# Patient Record
Sex: FEMALE | Employment: FULL TIME | URBAN - METROPOLITAN AREA
[De-identification: names, ages, dates, MRNs, and addresses within clinical notes are randomized per-mention and may not be internally consistent; named-entity substitution may affect disease eponyms.]

---

## 2017-01-13 ENCOUNTER — TELEPHONE (OUTPATIENT)
Dept: FAMILY MEDICINE | Facility: CLINIC | Age: 42
End: 2017-01-13

## 2017-01-13 NOTE — TELEPHONE ENCOUNTER
Panel Management Review      Patient has the following on her problem list: None      Composite cancer screening  Chart review shows that this patient is due/due soon for the following Pap Smear  Summary:    Patient is due/failing the following:   PAP and PHYSICAL    Action needed:   Patient needs office visit for annual physical.    Type of outreach:    Sent letter.    Questions for provider review:    None                                                                                                                                    Eloisa Giraldo MA       Chart routed to closed .

## 2017-01-13 NOTE — Clinical Note
[facility]  [address] [facility] [id number] [phone]          January 13, 2017    Karol Chanel Nwachi  [address]  [facility] [address]            Karol,      Our records indicate that you have not scheduled for a(n)annual female exam which was recommended by your health care team.     If you are receiving any of these services at another site please bring in a copy at your next visit so we can get it scanned into your chart.     Monitoring and managing your preventative and chronic health conditions are very important to us.     If you have received your health care elsewhere, please call the clinic so the information can be documented in your chart.    Please call [phone] or message us through your [employer] account to schedule an appointment or provide information for your chart.     I look forward to seeing you and working with you on your health care needs.     Sincerely,       Your [facility] Team/mariam              *If you have already scheduled an appointment, please disregard this reminder

## 2017-04-19 ENCOUNTER — TELEPHONE (OUTPATIENT)
Dept: FAMILY MEDICINE | Facility: CLINIC | Age: 42
End: 2017-04-19

## 2017-04-19 NOTE — TELEPHONE ENCOUNTER
Panel Management Review      Patient has the following on her problem list: None      Composite cancer screening  Chart review shows that this patient is due/due soon for the following Pap Smear  Summary:    Patient is due/failing the following:   PAP    Action needed:   Patient needs referral/order: pap    Type of outreach:    Sent letter.    Questions for provider review:    None                                                                                                                                    RAFAL Ames   5:10 PM  4/19/2017         Chart routed to closed .

## 2017-04-19 NOTE — LETTER
[facility]  [address] [facility] [address]  Phone: [phone]    04/19/17    Karol Coffmanuchi Nwachi  [address] [facility] [address]      To whom it may concern:     Our records indicate that you have not scheduled for a(n)annual female exam/pap which was recommended by your health care team. Monitoring and managing your preventative and chronic health conditions are very important to us.     If you have received your health care elsewhere, please provide us with that information so it can be documented in your chart.    Please call [phone] or message us through your [employer] account to schedule an appointment or provide information for your chart.     I look forward to seeing you and working with you on your health care needs.       *If you have already scheduled an appointment, please disregard this reminder    Sincerely,      Sherry Stockton PA-C

## 2018-04-11 ENCOUNTER — RADIANT APPOINTMENT (OUTPATIENT)
Dept: GENERAL RADIOLOGY | Facility: CLINIC | Age: 43
End: 2018-04-11
Attending: FAMILY MEDICINE
Payer: COMMERCIAL

## 2018-04-11 ENCOUNTER — OFFICE VISIT (OUTPATIENT)
Dept: FAMILY MEDICINE | Facility: CLINIC | Age: 43
End: 2018-04-11
Payer: COMMERCIAL

## 2018-04-11 VITALS
HEART RATE: 69 BPM | BODY MASS INDEX: 34.15 KG/M2 | RESPIRATION RATE: 18 BRPM | WEIGHT: 208.4 LBS | TEMPERATURE: 97.5 F | DIASTOLIC BLOOD PRESSURE: 71 MMHG | SYSTOLIC BLOOD PRESSURE: 127 MMHG | OXYGEN SATURATION: 100 %

## 2018-04-11 DIAGNOSIS — M25.561 ACUTE PAIN OF RIGHT KNEE: Primary | ICD-10-CM

## 2018-04-11 DIAGNOSIS — M25.561 ACUTE PAIN OF RIGHT KNEE: ICD-10-CM

## 2018-04-11 PROCEDURE — 99213 OFFICE O/P EST LOW 20 MIN: CPT | Performed by: FAMILY MEDICINE

## 2018-04-11 PROCEDURE — 73560 X-RAY EXAM OF KNEE 1 OR 2: CPT | Mod: RT

## 2018-04-11 ASSESSMENT — PAIN SCALES - GENERAL: PAINLEVEL: SEVERE PAIN (6)

## 2018-04-11 NOTE — LETTER
[facility]  [address] [facility] [id number] [phone]          April 11, 2018    RE:  Karol Pires                                                                                                                                                       [address] [facility] [address]            To whom it may concern:    Karlo Pires is under my professional care for Acute pain of right knee She  may return to work with the following: The employee is ABLE to return to work today.    When the patient returns to work, the following restrictions apply until one week when she needs to be seen:  A) Bend: Occasionally (1-3 hours)  B) Squat: Not at all (0 hours)  C) Walk/Stand: Frequently (4-8 hours)  D) Reach Above Shoulders: Frequently (4-8 hours)  E) Lift, carry, push, and pull no more than:  21-30 lbs.   Avoid pronged walking    Sincerely,        Sin Sexton MD

## 2018-04-11 NOTE — Clinical Note
Please abstract the following data from this visit with this patient into the appropriate field in Epic:  Pap smear done on this date: 4/2015 (approximately), by this group: [facility], results were found in care everywhere .

## 2018-04-11 NOTE — PROGRESS NOTES
SUBJECTIVE:  Karol Pires is a 42 year old female who presents to the clinic today for right knee pain.  Onset of symptoms: 1 week ago.  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 rate 18, weight 208 lb 6.4 oz (94.5 kg), SpO2 100 %.  Patient appears to be in alert and in no apparent distress distress  KNEE EXAM (right)  Effusion: yes  Erythema: no  Patellar tenderness: yes  Medial joint line tenderness: yes  Lateral joint line tenderness: no  Lachman's test: negative  Nir's maneuver: positive  Valgus:positive  Varus:negative  range of motion: full  Calves soft non-tender.  Neurovascularly Intact Distally.      X-rays: normal: no effusions, fractures, spurring, joint space narrowing or loose bodies    ICD-10-CM    1. Acute pain of right knee M25.561 XR Knee Right 1/2 Views    PLAN:Recheck 1 week see letter

## 2018-04-11 NOTE — NURSING NOTE
"Chief Complaint   Patient presents with     Knee Pain     right knee edema, no known injury but did twist knee , left knee previously bothering        Initial /71  Pulse 69  Temp 97.5  F (36.4  C) (Oral)  Resp 18  Wt 208 lb 6.4 oz (94.5 kg)  SpO2 100%  BMI 34.15 kg/m2 Estimated body mass index is 34.15 kg/(m^2) as calculated from the following:    Height as of 6/22/16: 5' 5.5\" (1.664 m).    Weight as of this encounter: 208 lb 6.4 oz (94.5 kg).  Medication Reconciliation: complete  Sanford Soliz CMA    "

## 2018-04-11 NOTE — MR AVS SNAPSHOT
"              After Visit Summary   4/11/2018    Karol Pires    MRN: [id number]           Patient Information     Date Of Birth          1975        Visit Information        Provider Department      4/11/2018 3:45 PM Sin Sexton MD [facility]        Today's Diagnoses     Acute pain of right knee    -  1       Follow-ups after your visit        Your next 10 appointments already scheduled     Apr 12, 2018  9:00 AM CDT   SHORT with Loan Hodges PA-C   [facility]    [address] [facility] [address] [phone]              Who to contact     If you have questions or need follow up information about today's clinic visit or your schedule please contact [facility] directly at [phone].  Normal or non-critical lab and imaging results will be communicated to you by MyChart, letter or phone within 4 business days after the clinic has received the results. If you do not hear from us within 7 days, please contact the clinic through MyChart or phone. If you have a critical or abnormal lab result, we will notify you by phone as soon as possible.  Submit refill requests through [employer] or call your pharmacy and they will forward the refill request to us. Please allow 3 business days for your refill to be completed.          Additional Information About Your Visit        MyChart Information     Resermap lets you send messages to your doctor, view your test results, renew your prescriptions, schedule appointments and more. To sign up, go to [email or web address] . Click on \"Log in\" on the left side of the screen, which will take you to the Welcome page. Then click on \"Sign up Now\" on the right side of the page.     You will be asked to enter the access code listed below, as well as some personal information. Please follow the directions to create your username and password.     Your access code is: " [email or web address]  Expires: 7/10/2018  4:42 PM     Your access code will  in 90 days. If you need help or a new code, please call your Mobile clinic or [phone].        Care EveryWhere ID     This is your Care EveryWhere ID. This could be used by other organizations to access your Mobile medical records  [id number]        Your Vitals Were     Pulse Temperature Respirations Pulse Oximetry BMI (Body Mass Index)       69 97.5  F (36.4  C) (Oral) 18 100% 34.15 kg/m2        Blood Pressure from Last 3 Encounters:   18 127/71   16 111/77   16 117/83    Weight from Last 3 Encounters:   18 208 lb 6.4 oz (94.5 kg)   16 202 lb 3.2 oz (91.7 kg)   16 198 lb 12.8 oz (90.2 kg)               Primary Care Provider Fax #    Physician No Ref-Primary [phone]       No address on file        Equal Access to Services     PATIENCE GILES : Hadii aad ku hadasho Soomaali, waaxda luqadaha, qaybta kaalmada adeegyalynda, anastacia abad . So [facility] [phone].    ATENCIÓN: Si habla español, tiene a castellanos disposición servicios gratuitos de asistencia lingüística. Llame al [phone].    We comply with applicable federal civil rights laws and [place] laws. We do not discriminate on the basis of race, color, national origin, age, disability, sex, sexual orientation, or gender identity.            Thank you!     Thank you for choosing [facility]  for your care. Our goal is always to provide you with excellent care. Hearing back from our patients is one way we can continue to improve our services. Please take a few minutes to complete the written survey that you may receive in the mail after your visit with us. Thank you!             Your Updated Medication List - Protect others around you: Learn how to safely use, store and throw away your medicines at www.disposemymeds.org.          This list is accurate as of 18  4:42 PM.  Always use your most recent med  list.                   Brand Name Dispense Instructions for use Diagnosis    ALEVE PO

## 2018-12-28 ENCOUNTER — OFFICE VISIT (OUTPATIENT)
Dept: URGENT CARE | Facility: URGENT CARE | Age: 43
End: 2018-12-28
Payer: COMMERCIAL

## 2018-12-28 VITALS
OXYGEN SATURATION: 99 % | HEART RATE: 81 BPM | DIASTOLIC BLOOD PRESSURE: 82 MMHG | SYSTOLIC BLOOD PRESSURE: 136 MMHG | WEIGHT: 206 LBS | TEMPERATURE: 98 F | BODY MASS INDEX: 33.76 KG/M2

## 2018-12-28 DIAGNOSIS — M79.662 PAIN OF LEFT CALF: Primary | ICD-10-CM

## 2018-12-28 PROCEDURE — 99214 OFFICE O/P EST MOD 30 MIN: CPT | Performed by: FAMILY MEDICINE

## 2018-12-28 ASSESSMENT — PAIN SCALES - GENERAL: PAINLEVEL: SEVERE PAIN (7)

## 2018-12-28 NOTE — PATIENT INSTRUCTIONS
Left calf pain 3-4 days - no injury  Worse with flexion  CONCERN FOR DVT - RECOMMEND ER EVALUATION FOR RULE OUT.

## 2018-12-28 NOTE — PROGRESS NOTES
Chief complaint: left calf pain    Denies any possibility of pregnancy declined a pregnancy test    Patient has been havign left calf pain   Patient when she hits down can't even flex  Been going on for 3-4 days    No family history of blood clots  No birth control  No recent surgery or trauma or travel    No fevers or chills chest pain or shortness of breath   No back pain     No Known Allergies    Past Medical History:   Diagnosis Date     Diabetes, gestational      Major depression      No active medical problems          Current Outpatient Medications on File Prior to Visit:  Naproxen Sodium (ALEVE PO)      No current facility-administered medications on file prior to visit.     Social History     Tobacco Use     Smoking status: Never Smoker     Smokeless tobacco: Never Used   Substance Use Topics     Alcohol use: No     Drug use: No       ROS:    No thoughts of harming self or others.     OBJECTIVE:  /82   Pulse 81   Temp 98  F (36.7  C) (Oral)   Wt 93.4 kg (206 lb)   SpO2 99%   BMI 33.76 kg/m     General:   awake, alert, and cooperative.  NAD.   Head: Normocephalic, atraumatic.  Eyes: Conjunctiva clear,  Neuro: Alert and oriented - normal speech.  MS: Using extremities freely  Positive katharine's sign left leg  Calf pain and tenderness  PSYCH:  Normal affect, normal speech  SKIN: no obvious rashes    ASSESSMENT:    ICD-10-CM    1. Pain of left calf M79.662        PLAN:   To ER to rule out DVT  Patient declined ambulance. Risks discussed. She will self transport  AVS given   Not sure which ER she plans to go      Advised about symptoms which might herald more serious problems.        Brandi Rose MD

## 2019-02-20 NOTE — PROGRESS NOTES
"   SUBJECTIVE:   CC: Karol Pires is an 43 year old woman who presents for preventive health visit.     Healthy Habits:    Do you get at least three servings of calcium containing foods daily (dairy, green leafy vegetables, etc.)? { :072574::\"yes\"}    Amount of exercise or daily activities, outside of work: { :748943}    Problems taking medications regularly { :918778::\"No\"}    Medication side effects: { :520953::\"No\"}    Have you had an eye exam in the past two years? { :938556}    Do you see a dentist twice per year? { :069026}    Do you have sleep apnea, excessive snoring or daytime drowsiness?{ :029956}  {Outside tests to abstract? :196884}    {additional problems to add (Optional):422034}    Today's PHQ-2 Score:   PHQ-2 ( 1999 [employer]) 4/11/2018 6/22/2016   Q1: Little interest or pleasure in doing things 0 0   Q2: Feeling down, depressed or hopeless 0 0   PHQ-2 Score 0 0     {PHQ-2 LOOK IN ASSESSMENTS (Optional) :943242}  Abuse: Current or Past(Physical, Sexual or Emotional)- {YES/NO/NA:755706}  Do you feel safe in your environment? {YES/NO/NA:209697}    Social History     Tobacco Use     Smoking status: Never Smoker     Smokeless tobacco: Never Used   Substance Use Topics     Alcohol use: No     If you drink alcohol do you typically have >3 drinks per day or >7 drinks per week? {ETOH :472953}                     Reviewed orders with patient.  Reviewed health maintenance and updated orders accordingly - {Yes/No:543930::\"Yes\"}  {Chronicprobdata (Optional):904830}    {Mammo Decision Support (Optional):770079}    Pertinent mammograms are reviewed under the imaging tab.  History of abnormal Pap smear: {PAP HX:933601}     Reviewed and updated as needed this visit by clinical staff         Reviewed and updated as needed this visit by Provider        {HISTORY OPTIONS (Optional):003727}    ROS:  { :716272}    OBJECTIVE:   There were no vitals taken for this visit.  EXAM:  {Exam Choices:041239}    {Diagnostic " "Test Results (Optional):964299::\"Diagnostic Test Results:\",\"none \"}    ASSESSMENT/PLAN:   {Diag Picklist:899085}    COUNSELING:   {FEMALE COUNSELING MESSAGES:896770::\"Reviewed preventive health counseling, as reflected in patient instructions\"}    BP Readings from Last 1 Encounters:   12/28/18 136/82     Estimated body mass index is 33.76 kg/m  as calculated from the following:    Height as of 6/22/16: 1.664 m (5' 5.5\").    Weight as of 12/28/18: 93.4 kg (206 lb).    {BP Counseling- Complete if BP >= 120/80  (Optional):538580}  {Weight Management Plan (ACO) Complete if BMI is abnormal-  Ages 18-64  BMI >24.9.  Age 65+ with BMI <23 or >30 (Optional):321627}     reports that  has never smoked. she has never used smokeless tobacco.  {Tobacco Cessation -- Complete if patient is a smoker (Optional):851099}    Counseling Resources:  ATP IV Guidelines  Pooled Cohorts Equation Calculator  Breast Cancer Risk Calculator  FRAX Risk Assessment  ICSI Preventive Guidelines  Dietary Guidelines for Americans, 2010  USDA's MyPlate  ASA Prophylaxis  Lung CA Screening    Michael Cruz PA-C  [facility]  "

## 2019-02-21 ENCOUNTER — OFFICE VISIT (OUTPATIENT)
Dept: FAMILY MEDICINE | Facility: CLINIC | Age: 44
End: 2019-02-21
Payer: COMMERCIAL

## 2019-02-21 VITALS
RESPIRATION RATE: 18 BRPM | DIASTOLIC BLOOD PRESSURE: 85 MMHG | WEIGHT: 204 LBS | SYSTOLIC BLOOD PRESSURE: 123 MMHG | TEMPERATURE: 98.4 F | BODY MASS INDEX: 33.43 KG/M2 | HEART RATE: 76 BPM | OXYGEN SATURATION: 100 %

## 2019-02-21 DIAGNOSIS — R32 URINARY INCONTINENCE, UNSPECIFIED TYPE: Primary | ICD-10-CM

## 2019-02-21 DIAGNOSIS — R41.3 MEMORY CHANGES: ICD-10-CM

## 2019-02-21 DIAGNOSIS — R80.9 PROTEINURIA, UNSPECIFIED TYPE: ICD-10-CM

## 2019-02-21 LAB
ALBUMIN UR-MCNC: 30 MG/DL
ANION GAP SERPL CALCULATED.3IONS-SCNC: 4 MMOL/L (ref 3–14)
APPEARANCE UR: CLEAR
BASOPHILS # BLD AUTO: 0 10E9/L (ref 0–0.2)
BASOPHILS NFR BLD AUTO: 0.2 %
BILIRUB UR QL STRIP: NEGATIVE
BUN SERPL-MCNC: 9 MG/DL (ref 7–30)
CALCIUM SERPL-MCNC: 9.1 MG/DL (ref 8.5–10.1)
CHLORIDE SERPL-SCNC: 107 MMOL/L (ref 94–109)
CO2 SERPL-SCNC: 29 MMOL/L (ref 20–32)
COLOR UR AUTO: YELLOW
CREAT SERPL-MCNC: 0.44 MG/DL (ref 0.52–1.04)
DIFFERENTIAL METHOD BLD: NORMAL
EOSINOPHIL # BLD AUTO: 0.1 10E9/L (ref 0–0.7)
EOSINOPHIL NFR BLD AUTO: 2.1 %
ERYTHROCYTE [DISTWIDTH] IN BLOOD BY AUTOMATED COUNT: 13.4 % (ref 10–15)
GFR SERPL CREATININE-BSD FRML MDRD: >90 ML/MIN/{1.73_M2}
GLUCOSE SERPL-MCNC: 88 MG/DL (ref 70–99)
GLUCOSE UR STRIP-MCNC: NEGATIVE MG/DL
HCT VFR BLD AUTO: 36.6 % (ref 35–47)
HGB BLD-MCNC: 11.9 G/DL (ref 11.7–15.7)
HGB UR QL STRIP: ABNORMAL
KETONES UR STRIP-MCNC: NEGATIVE MG/DL
LEUKOCYTE ESTERASE UR QL STRIP: NEGATIVE
LYMPHOCYTES # BLD AUTO: 2 10E9/L (ref 0.8–5.3)
LYMPHOCYTES NFR BLD AUTO: 47.2 %
MCH RBC QN AUTO: 27.5 PG (ref 26.5–33)
MCHC RBC AUTO-ENTMCNC: 32.5 G/DL (ref 31.5–36.5)
MCV RBC AUTO: 85 FL (ref 78–100)
MONOCYTES # BLD AUTO: 0.3 10E9/L (ref 0–1.3)
MONOCYTES NFR BLD AUTO: 7.1 %
NEUTROPHILS # BLD AUTO: 1.8 10E9/L (ref 1.6–8.3)
NEUTROPHILS NFR BLD AUTO: 43.4 %
NITRATE UR QL: NEGATIVE
NON-SQ EPI CELLS #/AREA URNS LPF: ABNORMAL /LPF
PH UR STRIP: 6 PH (ref 5–7)
PLATELET # BLD AUTO: 275 10E9/L (ref 150–450)
POTASSIUM SERPL-SCNC: 4.3 MMOL/L (ref 3.4–5.3)
RBC # BLD AUTO: 4.33 10E12/L (ref 3.8–5.2)
RBC #/AREA URNS AUTO: ABNORMAL /HPF
SODIUM SERPL-SCNC: 140 MMOL/L (ref 133–144)
SOURCE: ABNORMAL
SP GR UR STRIP: 1.02 (ref 1–1.03)
UROBILINOGEN UR STRIP-ACNC: 0.2 EU/DL (ref 0.2–1)
WBC # BLD AUTO: 4.2 10E9/L (ref 4–11)
WBC #/AREA URNS AUTO: ABNORMAL /HPF

## 2019-02-21 PROCEDURE — 99214 OFFICE O/P EST MOD 30 MIN: CPT | Performed by: PHYSICIAN ASSISTANT

## 2019-02-21 PROCEDURE — 81001 URINALYSIS AUTO W/SCOPE: CPT | Performed by: PHYSICIAN ASSISTANT

## 2019-02-21 PROCEDURE — 36415 COLL VENOUS BLD VENIPUNCTURE: CPT | Performed by: PHYSICIAN ASSISTANT

## 2019-02-21 PROCEDURE — 80048 BASIC METABOLIC PNL TOTAL CA: CPT | Performed by: PHYSICIAN ASSISTANT

## 2019-02-21 PROCEDURE — 85025 COMPLETE CBC W/AUTO DIFF WBC: CPT | Performed by: PHYSICIAN ASSISTANT

## 2019-02-21 NOTE — PROGRESS NOTES
"SUBJECTIVE:                                                    Karol Pires is a 43 year old female who presents to clinic today for the following health issues:  New patient to me with multiple concerns.    Pt was referred to see nephrology in the past - was told she had either glucose or blood in urine - has trouble with urinary incontinence at times for 2 month. Drinks coffee. No pain. No abdominal pains.     Memory issues at times  - worried something is going on with her brain - would like a MRI  Is forgetful.     Notices after she eats she gets a dizzy feeling for \"long time\"   - wondering if maybe she has diabetes.   Not attributed to any food.     Problem list and histories reviewed & adjusted, as indicated.  Additional history: as documented      Patient Active Problem List   Diagnosis     PPD positive     CARDIOVASCULAR SCREENING; LDL GOAL LESS THAN 160     Vitamin D deficiency     Past Surgical History:   Procedure Laterality Date     NO HISTORY OF SURGERY         Social History     Tobacco Use     Smoking status: Never Smoker     Smokeless tobacco: Never Used   Substance Use Topics     Alcohol use: No     History reviewed. No pertinent family history.      No current outpatient medications on file.     No Known Allergies  BP Readings from Last 3 Encounters:   02/21/19 123/85   12/28/18 136/82   04/11/18 127/71    Wt Readings from Last 3 Encounters:   02/21/19 92.5 kg (204 lb)   12/28/18 93.4 kg (206 lb)   04/11/18 94.5 kg (208 lb 6.4 oz)            Labs reviewed in EPIC    ROS:  Constitutional, HEENT, cardiovascular, pulmonary, gi and gu systems are negative, except as otherwise noted.    OBJECTIVE:     /85   Pulse 76   Temp 98.4  F (36.9  C) (Oral)   Resp 18   Wt 92.5 kg (204 lb)   SpO2 100%   BMI 33.43 kg/m    Body mass index is 33.43 kg/m .  GENERAL: healthy, alert and no distress- A&Ox3. Unable to count back from 100 by 7's. Recalled 2 of 3 objects.   EYES: Eyes grossly normal " to inspection, PERRL and conjunctivae and sclerae normal  HENT: ear canals and TM's normal, nose and mouth without ulcers or lesions  NECK: no adenopathy, no asymmetry, masses, or scars and thyroid normal to palpation  RESP: lungs clear to auscultation - no rales, rhonchi or wheezes  BREAST: normal without masses, tenderness or nipple discharge and no palpable axillary masses or adenopathy  CV: regular rate and rhythm, normal S1 S2, no S3 or S4, no murmur, click or rub, no peripheral edema and peripheral pulses strong  ABDOMEN: soft, nontender, no hepatosplenomegaly, no masses and bowel sounds normal  MS: no gross musculoskeletal defects noted, no edema  SKIN: no suspicious lesions or rashes  NEURO: Normal strength and tone, sensory exam grossly normal, mentation intact and cranial nerves 2-12 intact  PSYCH: mentation appears normal, affect normal/bright    Diagnostic Test Results:  Results for orders placed or performed in visit on 02/21/19 (from the past 24 hour(s))   UA reflex to Microscopic and Culture   Result Value Ref Range    Color Urine Yellow     Appearance Urine Clear     Glucose Urine Negative NEG^Negative mg/dL    Bilirubin Urine Negative NEG^Negative    Ketones Urine Negative NEG^Negative mg/dL    Specific Gravity Urine 1.025 1.003 - 1.035    Blood Urine Trace (A) NEG^Negative    pH Urine 6.0 5.0 - 7.0 pH    Protein Albumin Urine 30 (A) NEG^Negative mg/dL    Urobilinogen Urine 0.2 0.2 - 1.0 EU/dL    Nitrite Urine Negative NEG^Negative    Leukocyte Esterase Urine Negative NEG^Negative    Source Midstream Urine    Urine Microscopic   Result Value Ref Range    WBC Urine 0 - 5 OTO5^0 - 5 /HPF    RBC Urine 2-5 (A) OTO2^O - 2 /HPF    Squamous Epithelial /LPF Urine Few FEW^Few /LPF   CBC with platelets differential   Result Value Ref Range    WBC 4.2 4.0 - 11.0 10e9/L    RBC Count 4.33 3.8 - 5.2 10e12/L    Hemoglobin 11.9 11.7 - 15.7 g/dL    Hematocrit 36.6 35.0 - 47.0 %    MCV 85 78 - 100 fl    MCH 27.5 26.5  - 33.0 pg    MCHC 32.5 31.5 - 36.5 g/dL    RDW 13.4 10.0 - 15.0 %    Platelet Count 275 150 - 450 10e9/L    % Neutrophils 43.4 %    % Lymphocytes 47.2 %    % Monocytes 7.1 %    % Eosinophils 2.1 %    % Basophils 0.2 %    Absolute Neutrophil 1.8 1.6 - 8.3 10e9/L    Absolute Lymphocytes 2.0 0.8 - 5.3 10e9/L    Absolute Monocytes 0.3 0.0 - 1.3 10e9/L    Absolute Eosinophils 0.1 0.0 - 0.7 10e9/L    Absolute Basophils 0.0 0.0 - 0.2 10e9/L    Diff Method Automated Method        ASSESSMENT/PLAN:         ICD-10-CM    1. Urinary incontinence, unspecified type R32 UA reflex to Microscopic and Culture     Urine Microscopic     UROLOGY ADULT REFERRAL     CANCELED: Lipid panel reflex to direct LDL Fasting   2. Proteinuria, unspecified type R80.9 Basic metabolic panel     NEPHROLOGY ADULT REFERRAL     CBC with platelets differential   3. Memory changes R41.3 NEUROLOGY ADULT REFERRAL     CBC with platelets differential   1. Follow up  With Urology  2. Follow up  With Nephrology  3. Follow up  with Neurology    Labs pending  Follow up  As needed. Needs to establish PCP.       Michael Cruz PA-C  [facility]

## 2019-02-21 NOTE — PROGRESS NOTES
"SUBJECTIVE:                                                    Karol Pires is a 43 year old female who presents to clinic today for the following health issues:    Dizziness      Duration: ***    Description   Feeling faint:  { :831654}  Feeling like the surroundings are moving: { :388378}  Loss of consciousness or falls: { :961553}    Intensity:  {mild,moderate,severe:367207}    Accompanying signs and symptoms:   Nausea/vomitting: { :497101}  Palpitations: { :642085}  Weakness in arms or legs: { :323719}  Vision or speech changes: { :119459}  Ringing in ears (Tinnitus): { :458271}  Hearing loss related to dizziness: { :151432}  Other (fevers/chills/sweating/dyspnea): { :806885}    History (similar episodes/head trauma/previous evaluation/recent bleeding): {NONE DEFAULTED:637733::\"None\"}    Precipitating or alleviating factors (new meds/chemicals): {NONE DEFAULTED:149107::\"None\"}  Worse with activity/head movement: { :112898}    Therapies tried and outcome: {NONE DEFAULTED:368956::\"None\"}      Problem list and histories reviewed & adjusted, as indicated.  Additional history: {NONE - AS DOCUMENTED:681921::\"as documented\"}    {additional problems for provider to add:247735}    {HIST REVIEW/ LINKS 2:919643}    {PROVIDER CHARTING PREFERENCE:888812}    "

## 2019-03-26 DIAGNOSIS — R80.9 PROTEINURIA: Primary | ICD-10-CM

## 2019-03-27 ENCOUNTER — OFFICE VISIT (OUTPATIENT)
Dept: NEPHROLOGY | Facility: CLINIC | Age: 44
End: 2019-03-27
Attending: PHYSICIAN ASSISTANT
Payer: COMMERCIAL

## 2019-03-27 VITALS
HEART RATE: 90 BPM | SYSTOLIC BLOOD PRESSURE: 123 MMHG | HEIGHT: 65 IN | OXYGEN SATURATION: 100 % | DIASTOLIC BLOOD PRESSURE: 91 MMHG | BODY MASS INDEX: 34.39 KG/M2 | WEIGHT: 206.4 LBS

## 2019-03-27 DIAGNOSIS — R80.9 PROTEINURIA, UNSPECIFIED TYPE: Primary | ICD-10-CM

## 2019-03-27 DIAGNOSIS — R80.8 OTHER PROTEINURIA: ICD-10-CM

## 2019-03-27 PROCEDURE — 86038 ANTINUCLEAR ANTIBODIES: CPT | Performed by: INTERNAL MEDICINE

## 2019-03-27 PROCEDURE — 86160 COMPLEMENT ANTIGEN: CPT | Performed by: INTERNAL MEDICINE

## 2019-03-27 PROCEDURE — 99204 OFFICE O/P NEW MOD 45 MIN: CPT | Performed by: INTERNAL MEDICINE

## 2019-03-27 PROCEDURE — 36415 COLL VENOUS BLD VENIPUNCTURE: CPT | Performed by: INTERNAL MEDICINE

## 2019-03-27 ASSESSMENT — MIFFLIN-ST. JEOR: SCORE: 1582.1

## 2019-03-27 ASSESSMENT — PAIN SCALES - GENERAL: PAINLEVEL: NO PAIN (0)

## 2019-03-27 NOTE — LETTER
3/27/2019       RE: Karol Pires  [address]  [place] [address]     Dear Colleague,    Thank you for referring your patient, Karol Pires, to the [facility]. Please see a copy of my visit note below.    March 27, 2019    I was asked to see this patient by Michael Cruz PA-C     CC: Proteinuria.     HPI: Karol Pires is a 43 year old female who presents for evaluation of proteinuria   Has had protein on UA going back to 2013. I do not see quantification. She has had occasional blood and RBC's in her urine. She has had 5 pregnancies and has a h/o gestational DM with glycosuria in 2015 during her last pregnancy. No miscarriages. Does not have any other medical conditions, not on any medications.     - History of Hematuria: occasional microscopic   - Swelling: none   - Hx of UTIs: used to be quite frequent, not any more   - Hx of stones: no  - Rashes/Joint pain: no  - Family hx of kidney disease: no, HTN in mother   - NSAID use: occasional.     # Proteinuria: protein + on dipstick urine. Has had occasional blood and a few RBC's. Does not have any systemic symptoms concerning for autoimmune diseases. Creatinine is wnl.   --Will check urine albumin/cr ratio as well as protein/cr rato  --Check KIP, C3 C4 for completion of w/u although have low suspicion for lupus.   --Consider 24 hour urine collection if she has true proteinuria as orthostatic and exercise induced proteinuria cannot be ruled out.     Ammy Jones MD        No Known Allergies      No current outpatient medications on file prior to visit.  No current facility-administered medications on file prior to visit.     Past Medical History:   Diagnosis Date     Diabetes, gestational      Major depression      No active medical problems        Past Surgical History:   Procedure Laterality Date     NO HISTORY OF SURGERY         Social History  "    Tobacco Use     Smoking status: Never Smoker     Smokeless tobacco: Never Used   Substance Use Topics     Alcohol use: No     Drug use: No       No family history on file.    ROS: A 12 system review of systems was negative other than noted here or above.     Exam:  BP (!) 123/91 (BP Location: Left arm, Patient Position: Chair, Cuff Size: Adult Regular)   Pulse 90   Ht 1.651 m (5' 5\")   Wt 93.6 kg (206 lb 6.4 oz)   SpO2 100%   BMI 34.35 kg/m       GENERAL APPEARANCE: alert and no distress  EYES: PERRL, no scleral icterus  HENT: mouth without ulcers or lesions  NECK: supple, no adenopathy  RESP: lungs clear to auscultation   CV: regular rhythm, normal rate, no rub  Extremities: no clubbing, cyanosis, or edema  SKIN: no rash  NEURO: mentation intact and speech normal  PSYCH: affect normal/bright    Results:    No visits with results within 1 Day(s) from this visit.   Latest known visit with results is:   Office Visit on 02/21/2019   Component Date Value Ref Range Status     Color Urine 02/21/2019 Yellow   Final     Appearance Urine 02/21/2019 Clear   Final     Glucose Urine 02/21/2019 Negative  NEG^Negative mg/dL Final     Bilirubin Urine 02/21/2019 Negative  NEG^Negative Final     Ketones Urine 02/21/2019 Negative  NEG^Negative mg/dL Final     Specific Gravity Urine 02/21/2019 1.025  1.003 - 1.035 Final     Blood Urine 02/21/2019 Trace* NEG^Negative Final     pH Urine 02/21/2019 6.0  5.0 - 7.0 pH Final     Protein Albumin Urine 02/21/2019 30* NEG^Negative mg/dL Final     Urobilinogen Urine 02/21/2019 0.2  0.2 - 1.0 EU/dL Final     Nitrite Urine 02/21/2019 Negative  NEG^Negative Final     Leukocyte Esterase Urine 02/21/2019 Negative  NEG^Negative Final     Source 02/21/2019 Midstream Urine   Final     Sodium 02/21/2019 140  133 - 144 mmol/L Final     Potassium 02/21/2019 4.3  3.4 - 5.3 mmol/L Final     Chloride 02/21/2019 107  94 - 109 mmol/L Final     Carbon Dioxide 02/21/2019 29  20 - 32 mmol/L Final     " Anion Gap 02/21/2019 4  3 - 14 mmol/L Final     Glucose 02/21/2019 88  70 - 99 mg/dL Final    Non Fasting     Urea Nitrogen 02/21/2019 9  7 - 30 mg/dL Final     Creatinine 02/21/2019 0.44* 0.52 - 1.04 mg/dL Final     GFR Estimate 02/21/2019 >90  >60 mL/min/[1.73_m2] Final    Comment: Non  GFR Calc  Starting 12/18/2018, serum creatinine based estimated GFR (eGFR) will be   calculated using the Chronic Kidney Disease Epidemiology Collaboration   (CKD-EPI) equation.       GFR Estimate If Black 02/21/2019 >90  >60 mL/min/[1.73_m2] Final    Comment:  GFR Calc  Starting 12/18/2018, serum creatinine based estimated GFR (eGFR) will be   calculated using the Chronic Kidney Disease Epidemiology Collaboration   (CKD-EPI) equation.       Calcium 02/21/2019 9.1  8.5 - 10.1 mg/dL Final     WBC Urine 02/21/2019 0 - 5  OTO5^0 - 5 /HPF Final     RBC Urine 02/21/2019 2-5* OTO2^O - 2 /HPF Final     Squamous Epithelial /LPF Urine 02/21/2019 Few  FEW^Few /LPF Final     WBC 02/21/2019 4.2  4.0 - 11.0 10e9/L Final     RBC Count 02/21/2019 4.33  3.8 - 5.2 10e12/L Final     Hemoglobin 02/21/2019 11.9  11.7 - 15.7 g/dL Final     Hematocrit 02/21/2019 36.6  35.0 - 47.0 % Final     MCV 02/21/2019 85  78 - 100 fl Final     MCH 02/21/2019 27.5  26.5 - 33.0 pg Final     MCHC 02/21/2019 32.5  31.5 - 36.5 g/dL Final     RDW 02/21/2019 13.4  10.0 - 15.0 % Final     Platelet Count 02/21/2019 275  150 - 450 10e9/L Final     % Neutrophils 02/21/2019 43.4  % Final     % Lymphocytes 02/21/2019 47.2  % Final     % Monocytes 02/21/2019 7.1  % Final     % Eosinophils 02/21/2019 2.1  % Final     % Basophils 02/21/2019 0.2  % Final     Absolute Neutrophil 02/21/2019 1.8  1.6 - 8.3 10e9/L Final     Absolute Lymphocytes 02/21/2019 2.0  0.8 - 5.3 10e9/L Final     Absolute Monocytes 02/21/2019 0.3  0.0 - 1.3 10e9/L Final     Absolute Eosinophils 02/21/2019 0.1  0.0 - 0.7 10e9/L Final     Absolute Basophils 02/21/2019 0.0  0.0 - 0.2  10e9/L Final     Diff Method 02/21/2019 Automated Method   Final           Again, thank you for allowing me to participate in the care of your patient.      Sincerely,    Ammy Jones MD

## 2019-03-27 NOTE — PROGRESS NOTES
March 27, 2019    I was asked to see this patient by Michael Cruz PA-C     CC: Proteinuria.     HPI: Karol Pires is a 43 year old female who presents for evaluation of proteinuria   Has had protein on UA going back to 2013. I do not see quantification. She has had occasional blood and RBC's in her urine. She has had 5 pregnancies and has a h/o gestational DM with glycosuria in 2015 during her last pregnancy. No miscarriages. Does not have any other medical conditions, not on any medications.     - History of Hematuria: occasional microscopic   - Swelling: none   - Hx of UTIs: used to be quite frequent, not any more   - Hx of stones: no  - Rashes/Joint pain: no  - Family hx of kidney disease: no, HTN in mother   - NSAID use: occasional.     # Proteinuria: protein + on dipstick urine. Has had occasional blood and a few RBC's. Does not have any systemic symptoms concerning for autoimmune diseases. Creatinine is wnl.   --Will check urine albumin/cr ratio as well as protein/cr rato  --Check KIP, C3 C4 for completion of w/u although have low suspicion for lupus.   --Consider 24 hour urine collection if she has true proteinuria as orthostatic and exercise induced proteinuria cannot be ruled out.     Ammy Jones MD        No Known Allergies      No current outpatient medications on file prior to visit.  No current facility-administered medications on file prior to visit.     Past Medical History:   Diagnosis Date     Diabetes, gestational      Major depression      No active medical problems        Past Surgical History:   Procedure Laterality Date     NO HISTORY OF SURGERY         Social History     Tobacco Use     Smoking status: Never Smoker     Smokeless tobacco: Never Used   Substance Use Topics     Alcohol use: No     Drug use: No       No family history on file.    ROS: A 12 system review of systems was negative other than noted here or above.     Exam:  BP (!) 123/91 (BP Location: Left arm,  "Patient Position: Chair, Cuff Size: Adult Regular)   Pulse 90   Ht 1.651 m (5' 5\")   Wt 93.6 kg (206 lb 6.4 oz)   SpO2 100%   BMI 34.35 kg/m      GENERAL APPEARANCE: alert and no distress  EYES: PERRL, no scleral icterus  HENT: mouth without ulcers or lesions  NECK: supple, no adenopathy  RESP: lungs clear to auscultation   CV: regular rhythm, normal rate, no rub  Extremities: no clubbing, cyanosis, or edema  SKIN: no rash  NEURO: mentation intact and speech normal  PSYCH: affect normal/bright    Results:    No visits with results within 1 Day(s) from this visit.   Latest known visit with results is:   Office Visit on 02/21/2019   Component Date Value Ref Range Status     Color Urine 02/21/2019 Yellow   Final     Appearance Urine 02/21/2019 Clear   Final     Glucose Urine 02/21/2019 Negative  NEG^Negative mg/dL Final     Bilirubin Urine 02/21/2019 Negative  NEG^Negative Final     Ketones Urine 02/21/2019 Negative  NEG^Negative mg/dL Final     Specific Gravity Urine 02/21/2019 1.025  1.003 - 1.035 Final     Blood Urine 02/21/2019 Trace* NEG^Negative Final     pH Urine 02/21/2019 6.0  5.0 - 7.0 pH Final     Protein Albumin Urine 02/21/2019 30* NEG^Negative mg/dL Final     Urobilinogen Urine 02/21/2019 0.2  0.2 - 1.0 EU/dL Final     Nitrite Urine 02/21/2019 Negative  NEG^Negative Final     Leukocyte Esterase Urine 02/21/2019 Negative  NEG^Negative Final     Source 02/21/2019 Midstream Urine   Final     Sodium 02/21/2019 140  133 - 144 mmol/L Final     Potassium 02/21/2019 4.3  3.4 - 5.3 mmol/L Final     Chloride 02/21/2019 107  94 - 109 mmol/L Final     Carbon Dioxide 02/21/2019 29  20 - 32 mmol/L Final     Anion Gap 02/21/2019 4  3 - 14 mmol/L Final     Glucose 02/21/2019 88  70 - 99 mg/dL Final    Non Fasting     Urea Nitrogen 02/21/2019 9  7 - 30 mg/dL Final     Creatinine 02/21/2019 0.44* 0.52 - 1.04 mg/dL Final     GFR Estimate 02/21/2019 >90  >60 mL/min/[1.73_m2] Final    Comment: Non  GFR " Calc  Starting 12/18/2018, serum creatinine based estimated GFR (eGFR) will be   calculated using the Chronic Kidney Disease Epidemiology Collaboration   (CKD-EPI) equation.       GFR Estimate If Black 02/21/2019 >90  >60 mL/min/[1.73_m2] Final    Comment:  GFR Calc  Starting 12/18/2018, serum creatinine based estimated GFR (eGFR) will be   calculated using the Chronic Kidney Disease Epidemiology Collaboration   (CKD-EPI) equation.       Calcium 02/21/2019 9.1  8.5 - 10.1 mg/dL Final     WBC Urine 02/21/2019 0 - 5  OTO5^0 - 5 /HPF Final     RBC Urine 02/21/2019 2-5* OTO2^O - 2 /HPF Final     Squamous Epithelial /LPF Urine 02/21/2019 Few  FEW^Few /LPF Final     WBC 02/21/2019 4.2  4.0 - 11.0 10e9/L Final     RBC Count 02/21/2019 4.33  3.8 - 5.2 10e12/L Final     Hemoglobin 02/21/2019 11.9  11.7 - 15.7 g/dL Final     Hematocrit 02/21/2019 36.6  35.0 - 47.0 % Final     MCV 02/21/2019 85  78 - 100 fl Final     MCH 02/21/2019 27.5  26.5 - 33.0 pg Final     MCHC 02/21/2019 32.5  31.5 - 36.5 g/dL Final     RDW 02/21/2019 13.4  10.0 - 15.0 % Final     Platelet Count 02/21/2019 275  150 - 450 10e9/L Final     % Neutrophils 02/21/2019 43.4  % Final     % Lymphocytes 02/21/2019 47.2  % Final     % Monocytes 02/21/2019 7.1  % Final     % Eosinophils 02/21/2019 2.1  % Final     % Basophils 02/21/2019 0.2  % Final     Absolute Neutrophil 02/21/2019 1.8  1.6 - 8.3 10e9/L Final     Absolute Lymphocytes 02/21/2019 2.0  0.8 - 5.3 10e9/L Final     Absolute Monocytes 02/21/2019 0.3  0.0 - 1.3 10e9/L Final     Absolute Eosinophils 02/21/2019 0.1  0.0 - 0.7 10e9/L Final     Absolute Basophils 02/21/2019 0.0  0.0 - 0.2 10e9/L Final     Diff Method 02/21/2019 Automated Method   Final

## 2019-03-27 NOTE — NURSING NOTE
"Karol Pires's goals for this visit include:   Chief Complaint   Patient presents with     Consult     proteinuria       She requests these members of her care team be copied on today's visit information: NO    PCP: Valorie Higgins    Referring Provider:  Michael Cruz PA-C  [id number] [place] [address]    BP (!) 123/91 (BP Location: Left arm, Patient Position: Chair, Cuff Size: Adult Regular)   Pulse 90   Ht 1.651 m (5' 5\")   Wt 93.6 kg (206 lb 6.4 oz)   SpO2 100%   BMI 34.35 kg/m      Do you need any medication refills at today's visit? NO    Marlyn Acosta CMA      "

## 2019-03-28 LAB
ANA SER QL IF: NEGATIVE
C4 SERPL-MCNC: 34 MG/DL (ref 15–50)

## 2019-04-19 ENCOUNTER — PRE VISIT (OUTPATIENT)
Dept: NEUROLOGY | Facility: CLINIC | Age: 44
End: 2019-04-19

## 2019-04-19 NOTE — TELEPHONE ENCOUNTER
[facility] CLINICAL DOCUMENTATION    Pre-Visit Planning   PREVISIT INFORMATION                                                    Karol Buchanan Taylordarrell scheduled for future visit at [facility] specialty clinics.    Patient is scheduled to see Christen (provider) on  (date)  Reason for visit: R41.3 (ICD-10-CM) - Memory changes  Referring provider Oppel  Has patient seen previous specialist? ??  Medical Records:  EPIC    REVIEW                                                      New patient packet mailed to patient: Yes 4/3  Medication reconciliation complete: No      No current outpatient medications on file.       Allergies: Patient has no known allergies.    (insert provider dot-phrase for provider specific visit requirements)    PLAN/FOLLOW-UP NEEDED                                                      Previsit review complete.  Patient will see provider at future scheduled appointment.   Unable to contact pt. Unable to leave message.    Patient Reminders Given:  Please, make sure you bring an updated list of your medications.   If you are having a procedure, please, present 15 minutes early.  If you need to cancel or reschedule,please call [phone].    Darla Severin-Brown

## 2019-05-01 ENCOUNTER — TELEPHONE (OUTPATIENT)
Dept: NEPHROLOGY | Facility: CLINIC | Age: 44
End: 2019-05-01

## 2019-05-01 NOTE — LETTER
May 1, 2019      Karol Buchanan [facility]  [address]   [place] [address]              Dear Karol,    It appears that you have some lab orders ordered by Dr. Jones that have yet to be done. Please schedule an appointment at an [facility] or [facility] lab to have these done.     Let us know if you have any questions or concerns.       Sincerely,      Nephrology Clinic- Dr. Jones's office  [facility]

## 2019-05-01 NOTE — TELEPHONE ENCOUNTER
Received overdue lab result for patient. Upon chart review, lab work ordered by Dr. Jones has not been completed. Attempted to reach patient to discuss. Unable to reach patient. Left message.  Will send letter.     Loan Grider RN, BSN  Nephrology Care Coordinator  [facility]

## 2019-07-17 NOTE — PROGRESS NOTES
"   SUBJECTIVE:   CC: Karol Pires is an 44 year old woman who presents for preventive health visit.     Healthy Habits:    Do you get at least three servings of calcium containing foods daily (dairy, green leafy vegetables, etc.)? { :231384::\"yes\"}    Amount of exercise or daily activities, outside of work: { :245569}    Problems taking medications regularly { :997396::\"No\"}    Medication side effects: { :536978::\"No\"}    Have you had an eye exam in the past two years? { :144751}    Do you see a dentist twice per year? { :163492}    Do you have sleep apnea, excessive snoring or daytime drowsiness?{ :723338}  {Outside tests to abstract? :173738}    {additional problems to add (Optional):838951}    Today's PHQ-2 Score:   PHQ-2 ( 1999 Pfizer) 4/11/2018 6/22/2016   Q1: Little interest or pleasure in doing things 0 0   Q2: Feeling down, depressed or hopeless 0 0   PHQ-2 Score 0 0     {PHQ-2 LOOK IN ASSESSMENTS (Optional) :234825}  Abuse: Current or Past(Physical, Sexual or Emotional)- {YES/NO/NA:966558}  Do you feel safe in your environment? {YES/NO/NA:136776}    Social History     Tobacco Use     Smoking status: Never Smoker     Smokeless tobacco: Never Used   Substance Use Topics     Alcohol use: No     If you drink alcohol do you typically have >3 drinks per day or >7 drinks per week? {ETOH :907620}                     Reviewed orders with patient.  Reviewed health maintenance and updated orders accordingly - {Yes/No:399733::\"Yes\"}  {Chronicprobdata (Optional):732988}    {Mammo Decision Support (Optional):702693}    Pertinent mammograms are reviewed under the imaging tab.  History of abnormal Pap smear: {PAP HX:549617}     Reviewed and updated as needed this visit by clinical staff         Reviewed and updated as needed this visit by Provider        {HISTORY OPTIONS (Optional):733460}    ROS:  { :756164}    OBJECTIVE:   There were no vitals taken for this visit.  EXAM:  {Exam Choices:203676}    {Diagnostic " "Test Results (Optional):630730::\"Diagnostic Test Results:\",\"Labs reviewed in Epic\"}    ASSESSMENT/PLAN:   {Diag Picklist:071285}    COUNSELING:   {FEMALE COUNSELING MESSAGES:343433::\"Reviewed preventive health counseling, as reflected in patient instructions\"}    Estimated body mass index is 34.35 kg/m  as calculated from the following:    Height as of 3/27/19: 1.651 m (5' 5\").    Weight as of 3/27/19: 93.6 kg (206 lb 6.4 oz).    {Weight Management Plan (ACO) Complete if BMI is abnormal-  Ages 18-64  BMI >24.9.  Age 65+ with BMI <23 or >30 (Optional):472720}     reports that she has never smoked. She has never used smokeless tobacco.  {Tobacco Cessation -- Complete if patient is a smoker (Optional):815032}    Counseling Resources:  ATP IV Guidelines  Pooled Cohorts Equation Calculator  Breast Cancer Risk Calculator  FRAX Risk Assessment  ICSI Preventive Guidelines  Dietary Guidelines for Americans, 2010  USDA's MyPlate  ASA Prophylaxis  Lung CA Screening    Michael Cruz PA-C  [facility]  "

## 2019-07-18 ENCOUNTER — OFFICE VISIT (OUTPATIENT)
Dept: FAMILY MEDICINE | Facility: CLINIC | Age: 44
End: 2019-07-18
Payer: COMMERCIAL

## 2019-07-18 VITALS
BODY MASS INDEX: 33.66 KG/M2 | HEIGHT: 65 IN | DIASTOLIC BLOOD PRESSURE: 79 MMHG | OXYGEN SATURATION: 100 % | HEART RATE: 80 BPM | TEMPERATURE: 97.7 F | RESPIRATION RATE: 14 BRPM | SYSTOLIC BLOOD PRESSURE: 118 MMHG | WEIGHT: 202 LBS

## 2019-07-18 DIAGNOSIS — Z01.84 IMMUNITY STATUS TESTING: ICD-10-CM

## 2019-07-18 DIAGNOSIS — Z00.00 ROUTINE GENERAL MEDICAL EXAMINATION AT A HEALTH CARE FACILITY: Primary | ICD-10-CM

## 2019-07-18 DIAGNOSIS — Z13.220 SCREENING FOR HYPERLIPIDEMIA: ICD-10-CM

## 2019-07-18 LAB
ANION GAP SERPL CALCULATED.3IONS-SCNC: 6 MMOL/L (ref 3–14)
BUN SERPL-MCNC: 9 MG/DL (ref 7–30)
CALCIUM SERPL-MCNC: 9.4 MG/DL (ref 8.5–10.1)
CHLORIDE SERPL-SCNC: 107 MMOL/L (ref 94–109)
CHOLEST SERPL-MCNC: 207 MG/DL
CO2 SERPL-SCNC: 29 MMOL/L (ref 20–32)
CREAT SERPL-MCNC: 0.46 MG/DL (ref 0.52–1.04)
GFR SERPL CREATININE-BSD FRML MDRD: >90 ML/MIN/{1.73_M2}
GLUCOSE SERPL-MCNC: 95 MG/DL (ref 70–99)
HDLC SERPL-MCNC: 77 MG/DL
LDLC SERPL CALC-MCNC: 119 MG/DL
NONHDLC SERPL-MCNC: 130 MG/DL
POTASSIUM SERPL-SCNC: 3.7 MMOL/L (ref 3.4–5.3)
SODIUM SERPL-SCNC: 142 MMOL/L (ref 133–144)
TRIGL SERPL-MCNC: 53 MG/DL

## 2019-07-18 PROCEDURE — 86706 HEP B SURFACE ANTIBODY: CPT | Performed by: PHYSICIAN ASSISTANT

## 2019-07-18 PROCEDURE — 86787 VARICELLA-ZOSTER ANTIBODY: CPT | Performed by: PHYSICIAN ASSISTANT

## 2019-07-18 PROCEDURE — 36415 COLL VENOUS BLD VENIPUNCTURE: CPT | Performed by: PHYSICIAN ASSISTANT

## 2019-07-18 PROCEDURE — 80048 BASIC METABOLIC PNL TOTAL CA: CPT | Performed by: PHYSICIAN ASSISTANT

## 2019-07-18 PROCEDURE — 86481 TB AG RESPONSE T-CELL SUSP: CPT | Performed by: PHYSICIAN ASSISTANT

## 2019-07-18 PROCEDURE — 99396 PREV VISIT EST AGE 40-64: CPT | Performed by: PHYSICIAN ASSISTANT

## 2019-07-18 PROCEDURE — 80061 LIPID PANEL: CPT | Performed by: PHYSICIAN ASSISTANT

## 2019-07-18 ASSESSMENT — ENCOUNTER SYMPTOMS
JOINT SWELLING: 1
PARESTHESIAS: 0
DIARRHEA: 0
PALPITATIONS: 1
HEMATOCHEZIA: 0
NERVOUS/ANXIOUS: 1
SORE THROAT: 0
EYE PAIN: 0
SHORTNESS OF BREATH: 0
HEARTBURN: 0
WEAKNESS: 0
FEVER: 0
COUGH: 0
HEMATURIA: 0
FREQUENCY: 1
ARTHRALGIAS: 1
DIZZINESS: 1
HEADACHES: 1
MYALGIAS: 0
ABDOMINAL PAIN: 0
NAUSEA: 0
DYSURIA: 0
BREAST MASS: 0
CONSTIPATION: 0
CHILLS: 0

## 2019-07-18 ASSESSMENT — MIFFLIN-ST. JEOR: SCORE: 1557.15

## 2019-07-18 NOTE — PROGRESS NOTES
SUBJECTIVE:   CC: Karol Pires is an 44 year old woman who presents for preventive health visit.     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 She was born and  raised in [place], emigrated here in 2001. She believes she had  childhood illnesses such as measles    Care Everywhere Reviewed  There is record of MMR titers from 2015.     She states she has had positive TB with deferral or tx. No records available at time of visit.     Today's PHQ-2 Score:   PHQ-2 ( 1999 Pfizer) 7/18/2019   Q1: Little interest or pleasure in doing things 0   Q2: Feeling down, depressed or hopeless 0   PHQ-2 Score 0   Q1: Little interest or pleasure in doing things Not at all   Q2: Feeling down, depressed or hopeless Not at all   PHQ-2 Score 0           Social History     Tobacco Use     Smoking status: Never Smoker     Smokeless tobacco: Never Used   Substance Use Topics     Alcohol use: No       Alcohol Use 7/18/2019   Prescreen: >3 drinks/day or >7 drinks/week? No       Reviewed orders with patient.  Reviewed health maintenance and updated orders accordingly - Yes  Lab work is in process  Labs reviewed in EPIC  BP Readings from Last 3 Encounters:   07/18/19 118/79   03/27/19 (!) 123/91   02/21/19 123/85    Wt Readings from Last 3 Encounters:   07/18/19 91.6 kg (202 lb)   03/27/19 93.6 kg (206 lb 6.4 oz)   02/21/19 92.5 kg (204 lb)                  Patient Active Problem List   Diagnosis     Positive PPD      CARDIOVASCULAR SCREENING; LDL GOAL LESS THAN 160     Vitamin D deficiency     Other proteinuria     Past Surgical History:   Procedure Laterality Date     NO HISTORY OF SURGERY         Social History     Tobacco Use     Smoking status: Never Smoker     Smokeless tobacco: Never Used   Substance Use Topics     Alcohol use: No     History reviewed. No pertinent family history.      No current outpatient medications on file.     No Known Allergies    Recommend mammogram.    Pertinent mammograms are reviewed under the imaging tab.  History of abnormal Pap smear: NO - age 30-65 PAP every 5 years with negative HPV co-testing recommended     Reviewed and updated as needed this visit by clinical staff  Tobacco  Allergies  Meds  Med Hx  Surg Hx  Fam Hx  Soc Hx        Reviewed and updated as needed this visit by Provider          Past Medical History:   Diagnosis Date     Diabetes, gestational      Major depression      No active medical problems       Past Surgical History:   Procedure Laterality Date     NO HISTORY OF SURGERY         Review of Systems   Constitutional: Negative for chills and fever.   HENT: Negative for congestion, ear pain, hearing loss and sore throat.    Eyes: Positive for visual disturbance. Negative for pain.   Respiratory: Negative for cough and shortness of breath.    Cardiovascular: Positive for palpitations. Negative for chest pain and peripheral edema.   Gastrointestinal: Negative for abdominal pain, constipation, diarrhea, heartburn, hematochezia and nausea.   Breasts:  Negative for tenderness, breast mass and discharge.   Genitourinary: Positive for frequency and urgency. Negative for dysuria, genital sores, hematuria, pelvic pain, vaginal bleeding and vaginal discharge.   Musculoskeletal: Positive for arthralgias and joint swelling. Negative for myalgias.   Skin: Negative for rash.   Neurological: Positive for dizziness and headaches. Negative for weakness and paresthesias.  "  Psychiatric/Behavioral: Negative for mood changes. The patient is nervous/anxious.         OBJECTIVE:   /79   Pulse 80   Temp 97.7  F (36.5  C) (Oral)   Resp 14   Ht 1.651 m (5' 5\")   Wt 91.6 kg (202 lb)   LMP  (LMP Unknown)   SpO2 100%   BMI 33.61 kg/m    Physical Exam  GENERAL: healthy, alert and no distress  EYES: Eyes grossly normal to inspection, PERRL and conjunctivae and sclerae normal  HENT: ear canals and TM's normal, nose and mouth without ulcers or lesions  NECK: no adenopathy, no asymmetry, masses, or scars and thyroid normal to palpation  RESP: lungs clear to auscultation - no rales, rhonchi or wheezes  CV: regular rate and rhythm, normal S1 S2, no S3 or S4, no murmur, click or rub, no peripheral edema and peripheral pulses strong  ABDOMEN: soft, nontender, no hepatosplenomegaly, no masses and bowel sounds normal   (female): deferred  MS: no gross musculoskeletal defects noted, no edema  SKIN: no suspicious lesions or rashes  NEURO: Normal strength and tone, mentation intact and speech normal  PSYCH: mentation appears normal, affect normal/bright    Diagnostic Test Results:  Labs reviewed in Epic  No results found for this or any previous visit (from the past 24 hour(s)).    ASSESSMENT/PLAN:       ICD-10-CM    1. Routine general medical examination at a health care facility Z00.00 Basic metabolic panel   2. Immunity status testing Z01.84 Quantiferon TB Gold Plus     Varicella Zoster Virus Antibody IgG     Hepatitis B Surface Antibody     CANCELED: Rubeola Antibody IgG     CANCELED: Mumps Immune Status, IgG     CANCELED: Rubella Antibody IgG Quantitative   3. Screening for hyperlipidemia Z13.220 Lipid panel reflex to direct LDL Fasting   1. Work on Healthy diet and exercise. Getting heart rate elevated for 30 mins most days of week.  Labs pending  2. Labs pending  Form filled out    COUNSELING:  Reviewed preventive health counseling, as reflected in patient instructions       Regular " "exercise       Healthy diet/nutrition       Vision screening    Estimated body mass index is 33.61 kg/m  as calculated from the following:    Height as of this encounter: 1.651 m (5' 5\").    Weight as of this encounter: 91.6 kg (202 lb).    Weight management plan: Discussed healthy diet and exercise guidelines     reports that she has never smoked. She has never used smokeless tobacco.      Counseling Resources:  ATP IV Guidelines  Pooled Cohorts Equation Calculator  Breast Cancer Risk Calculator  FRAX Risk Assessment  ICSI Preventive Guidelines  Dietary Guidelines for Americans, 2010  USDA's MyPlate  ASA Prophylaxis  Lung CA Screening    Michael Cruz PA-C  [facility]  "

## 2019-07-18 NOTE — NURSING NOTE
VISION   No corrective lenses  Tool used: Franco   Right eye:        10/10 (20/20)  Left eye:          10/10 (20/20)  Visual Acuity: Pass  H Plus Lens Screening: Pass  Color vision screening: Pass      HEARING FREQUENCY    Right Ear:      1000 Hz RESPONSE- on Level: 40 db (Conditioning sound)   1000 Hz: RESPONSE- on Level:   20 db    2000 Hz: RESPONSE- on Level:   20 db    4000 Hz: RESPONSE- on Level:   20 db     Left Ear:      4000 Hz: RESPONSE- on Level:   20 db    2000 Hz: RESPONSE- on Level:   20 db    1000 Hz: RESPONSE- on Level:   20 db     500 Hz: RESPONSE- on Level: 25 db    Right Ear:    500 Hz: RESPONSE- on Level: 25 db    Hearing Acuity: Pass    Hearing Assessment: normal

## 2019-07-18 NOTE — LETTER
July 23, 2019      Karol Buchanan [facility]  [address]  [place] [address]      Ms. Pires,     All of your labs were normal for you.   Your TB test was neg.   You have immunity to varicella (chicken pox virus)      Please contact the clinic if you have additional questions.  Thank you.     Sincerely,   Michael Cruz PA-C  / raquel    Resulted Orders   Lipid panel reflex to direct LDL Fasting   Result Value Ref Range    Cholesterol 207 (H) <200 mg/dL      Comment:      Desirable:       <200 mg/dl    Triglycerides 53 <150 mg/dL      Comment:      Fasting specimen    HDL Cholesterol 77 >49 mg/dL    LDL Cholesterol Calculated 119 (H) <100 mg/dL      Comment:      Above desirable:  100-129 mg/dl  Borderline High:  130-159 mg/dL  High:             160-189 mg/dL  Very high:       >189 mg/dl      Non HDL Cholesterol 130 (H) <130 mg/dL      Comment:      Above Desirable:  130-159 mg/dl  Borderline high:  160-189 mg/dl  High:             190-219 mg/dl  Very high:       >219 mg/dl     Quantiferon TB Gold Plus   Result Value Ref Range    Quantiferon-TB Gold Plus Result Negative NEG^Negative      Comment:      No interferon gamma response to M.tuberculosis antigens was detected.   Infection with M.tuberculosis is unlikely, however a single negative result   does not exclude infection. In patients at high risk for infection, a second   test should be considered  in accordance with the 2017 ATS/IDSA/CDC Clinical Practice Guidelines for   Diagnosis of Tuberculosis in Adults and Children [Mginsohn NADEEN et   al.Clin.Infect.Dis. 2017 64(2):111-115].      TB1 Ag minus Nil Value 0.11 IU/mL    TB2 Ag minus Nil Value 0.04 IU/mL    Mitogen minus Nil Result 7.56 IU/mL    Nil Result 0.09 IU/mL   Basic metabolic panel   Result Value Ref Range    Sodium 142 133 - 144 mmol/L    Potassium 3.7 3.4 - 5.3 mmol/L    Chloride 107 94 - 109 mmol/L    Carbon Dioxide 29 20 - 32 mmol/L    Anion Gap 6 3 - 14 mmol/L    Glucose 95 70 - 99 mg/dL       Comment:      Fasting specimen    Urea Nitrogen 9 7 - 30 mg/dL    Creatinine 0.46 (L) 0.52 - 1.04 mg/dL    GFR Estimate >90 >60 mL/min/[1.73_m2]      Comment:      Non  GFR Calc  Starting 12/18/2018, serum creatinine based estimated GFR (eGFR) will be   calculated using the Chronic Kidney Disease Epidemiology Collaboration   (CKD-EPI) equation.      GFR Estimate If Black >90 >60 mL/min/[1.73_m2]      Comment:       GFR Calc  Starting 12/18/2018, serum creatinine based estimated GFR (eGFR) will be   calculated using the Chronic Kidney Disease Epidemiology Collaboration   (CKD-EPI) equation.      Calcium 9.4 8.5 - 10.1 mg/dL   Varicella Zoster Virus Antibody IgG   Result Value Ref Range    Varicella Zoster Virus Antibody IgG 2.9 (H) 0.0 - 0.8 AI      Comment:      Positive, suggests prev. exposure and probable immunity  Antibody index (AI) values reflect qualitative changes in antibody   concentration that cannot be directly associated with clinical condition or   disease state.     Hepatitis B Surface Antibody   Result Value Ref Range    Hepatitis B Surface Antibody 6.51 <8.00 m[IU]/mL      Comment:      Nonreactive, No antibody detected when the value is less than 8.00 m[IU]/mL.

## 2019-07-19 LAB
HBV SURFACE AB SERPL IA-ACNC: 6.51 M[IU]/ML
VZV IGG SER QL IA: 2.9 AI (ref 0–0.8)

## 2019-07-22 LAB
GAMMA INTERFERON BACKGROUND BLD IA-ACNC: 0.09 IU/ML
M TB IFN-G BLD-IMP: NEGATIVE
M TB IFN-G CD4+ BCKGRND COR BLD-ACNC: 7.56 IU/ML
MITOGEN IGNF BCKGRD COR BLD-ACNC: 0.04 IU/ML
MITOGEN IGNF BCKGRD COR BLD-ACNC: 0.11 IU/ML

## 2019-07-23 NOTE — RESULT ENCOUNTER NOTE
Ms. Lexis,    All of your labs were normal for you.  Your TB test was neg.   You have immunity to varicella (chicken pox virus)    Please contact the clinic if you have additional questions.  Thank you.    Sincerely,    Michael Cruz PA-C

## 2019-07-24 ENCOUNTER — TELEPHONE (OUTPATIENT)
Dept: FAMILY MEDICINE | Facility: CLINIC | Age: 44
End: 2019-07-24

## 2019-07-24 NOTE — TELEPHONE ENCOUNTER
Pt came in and wants you to fill out a form, she also would like to discuss test results.  Please call to discuss, number to call back on is her cell phone. Ok to leave message.    Thank you

## 2019-07-24 NOTE — TELEPHONE ENCOUNTER
::  Please assist with completing her form.  She had labwork done on 7/18/19 to check status on immunities to varicella, hep be and the the quantiferon TB test.  She would like a call back as soon as the form is completed.  She states needs this by 7/27.  Jaqueline Dinero RN

## 2019-07-25 NOTE — TELEPHONE ENCOUNTER
Michael completed the form.  I brought the form and a copy of the 7/23/19 lab result letter we mailed to Karol up to the  and it is ready for . I called the patient and RADHA Young,

## 2019-10-29 ENCOUNTER — OFFICE VISIT (OUTPATIENT)
Dept: OPTOMETRY | Facility: CLINIC | Age: 44
End: 2019-10-29
Payer: COMMERCIAL

## 2019-10-29 DIAGNOSIS — H52.4 PRESBYOPIA: Primary | ICD-10-CM

## 2019-10-29 DIAGNOSIS — H52.221 REGULAR ASTIGMATISM, RIGHT EYE: ICD-10-CM

## 2019-10-29 PROCEDURE — 92004 COMPRE OPH EXAM NEW PT 1/>: CPT | Performed by: OPTOMETRIST

## 2019-10-29 ASSESSMENT — REFRACTION_WEARINGRX
SPECS_TYPE: OTC'S
OS_SPHERE: +1.25
OD_SPHERE: +1.25

## 2019-10-29 ASSESSMENT — REFRACTION_MANIFEST
OD_ADD: +2.00
OS_CYLINDER: +0.25
OD_SPHERE: PLANO
OD_AXIS: 045
OS_AXIS: 112
OD_AXIS: 056
OS_CYLINDER: SPHERE
OD_SPHERE: 0.00
OD_CYLINDER: +0.50
OS_ADD: +2.00
OS_SPHERE: -0.25
OD_CYLINDER: +0.25
METHOD_AUTOREFRACTION: 1
OS_SPHERE: PLANO

## 2019-10-29 ASSESSMENT — VISUAL ACUITY
OD_CC: 20/50-1
OS_CC: 20/30
OD_SC+: -3
OD_SC: 20/20
OS_SC: 20/20
CORRECTION_TYPE: GLASSES
OS_SC+: -1
METHOD: SNELLEN - LINEAR

## 2019-10-29 ASSESSMENT — SLIT LAMP EXAM - LIDS
COMMENTS: NORMAL
COMMENTS: NORMAL

## 2019-10-29 ASSESSMENT — KERATOMETRY
OS_AXISANGLE2_DEGREES: 175
OD_K1POWER_DIOPTERS: 41.00
OD_AXISANGLE2_DEGREES: 168
OS_K1POWER_DIOPTERS: 41.50
OD_K2POWER_DIOPTERS: 42.00
OS_K2POWER_DIOPTERS: 42.25

## 2019-10-29 ASSESSMENT — TONOMETRY
OS_IOP_MMHG: 19
OD_IOP_MMHG: 19
IOP_METHOD: APPLANATION

## 2019-10-29 ASSESSMENT — EXTERNAL EXAM - RIGHT EYE: OD_EXAM: NORMAL

## 2019-10-29 ASSESSMENT — CONF VISUAL FIELD
OD_NORMAL: 1
OS_NORMAL: 1
METHOD: COUNTING FINGERS

## 2019-10-29 ASSESSMENT — EXTERNAL EXAM - LEFT EYE: OS_EXAM: NORMAL

## 2019-10-29 ASSESSMENT — CUP TO DISC RATIO
OS_RATIO: 0.3
OD_RATIO: 0.3

## 2019-10-29 NOTE — PROGRESS NOTES
Subjective     Karol Pires is a 44 year old female who presents to clinic today for the following health issues:    HPI     PAP only. Is due. Had physical with another provider this summer.   Also mentions 2 months of cottage cheese discharge and foul odor in vaginal area per patient.  No pelvic pain. No fevers. No abnormal bleeding.     Is obese. No diabetes glucose normal this summer.       Patient Active Problem List   Diagnosis     Positive PPD     CARDIOVASCULAR SCREENING; LDL GOAL LESS THAN 160     Vitamin D deficiency     Other proteinuria     Past Surgical History:   Procedure Laterality Date     NO HISTORY OF SURGERY         Social History     Tobacco Use     Smoking status: Never Smoker     Smokeless tobacco: Never Used   Substance Use Topics     Alcohol use: No     Family History   Problem Relation Age of Onset     Glaucoma No family hx of      Macular Degeneration No family hx of          No current outpatient medications on file.     No Known Allergies  Reviewed and updated as needed this visit by Provider         Review of Systems   ROS COMP: Constitutional, HEENT, cardiovascular, pulmonary, GI, , musculoskeletal, neuro, skin, endocrine and psych systems are negative, except as otherwise noted.      Objective    /75   Pulse 76   Temp 98.9  F (37.2  C) (Oral)   Resp 16   Wt 93 kg (205 lb)   LMP 10/27/2019 (Approximate)   SpO2 100%   Breastfeeding? No   BMI 34.11 kg/m    Body mass index is 34.11 kg/m .  Physical Exam   GENERAL: no distress and obese  RESP: {:non-labored  CV: {:RRR   (female): normal female external genitalia, normal urethral meatus, vaginal mucosa, normal cervix/adnexa/uterus without masses. Thick white cottage cheese like discharge present. Swabbed and sent for wet prep.   MS: no gross musculoskeletal defects noted, no edema  NEURO: Normal strength and tone, mentation intact and speech normal  PSYCH: mentation appears normal, affect normal/bright    Results  for orders placed or performed in visit on 11/11/19   Wet prep     Status: Abnormal   Result Value Ref Range    Specimen Description Vagina     Wet Prep No Trichomonas seen     Wet Prep No clue cells seen     Wet Prep Few  Yeast seen   (A)     Wet Prep Moderate  WBC'S seen                Assessment & Plan     1. Screening for malignant neoplasm of cervix    - Pap imaged thin layer screen with HPV - recommended age 30 - 65 years (select HPV order below)  - HPV High Risk Types DNA Cervical    2. Vaginal discharge  Yeast infection  Treat as below  Side effects discussed    - Wet prep  - fluconazole (DIFLUCAN) 150 MG tablet; Take 1 tablet (150 mg) by mouth once for 1 dose  Dispense: 1 tablet; Refill: 0       Loan Hodges PA-C  [facility]

## 2019-10-29 NOTE — PROGRESS NOTES
"Chief Complaint   Patient presents with     Annual Eye Exam     New to Eye Dept          Last Eye Exam: 12/20/2017  Dilated Previously: Yes    What are you currently using to see?  readers       Distance Vision Acuity: Satisfied with vision, no problems or concerns with distance vision     Near Vision Acuity: Satisfied with vision while reading and using computer with readers. Patient said that without readers letters are \"jumping\"     Eye Comfort: good usually, sometimes has issues with allergies   Do you use eye drops? 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       OBJECTIVE: See Ophthalmology exam    ASSESSMENT:    ICD-10-CM    1. Presbyopia H52.4    2. Regular astigmatism, right eye H52.221       PLAN:     Patient Instructions   Patient was advised of today's exam findings.  Fill glasses prescription  Good eye health  Return in 1-2 years for eye exam    Pearl Fontanez O.D.  [facility]   [address] [place] [address]  [phone]       "

## 2019-10-29 NOTE — PATIENT INSTRUCTIONS
Patient was advised of today's exam findings.  Fill glasses prescription  Good eye health  Return in 1-2 years for eye exam    Pearl Fontanez O.D.  [facility]   [address] [place] [address] [phone]

## 2019-11-11 ENCOUNTER — OFFICE VISIT (OUTPATIENT)
Dept: FAMILY MEDICINE | Facility: CLINIC | Age: 44
End: 2019-11-11
Payer: COMMERCIAL

## 2019-11-11 VITALS
OXYGEN SATURATION: 100 % | DIASTOLIC BLOOD PRESSURE: 75 MMHG | BODY MASS INDEX: 34.11 KG/M2 | WEIGHT: 205 LBS | TEMPERATURE: 98.9 F | RESPIRATION RATE: 16 BRPM | HEART RATE: 76 BPM | SYSTOLIC BLOOD PRESSURE: 133 MMHG

## 2019-11-11 DIAGNOSIS — Z12.4 SCREENING FOR MALIGNANT NEOPLASM OF CERVIX: Primary | ICD-10-CM

## 2019-11-11 DIAGNOSIS — N89.8 VAGINAL DISCHARGE: ICD-10-CM

## 2019-11-11 LAB
SPECIMEN SOURCE: ABNORMAL
WET PREP SPEC: ABNORMAL

## 2019-11-11 PROCEDURE — 99213 OFFICE O/P EST LOW 20 MIN: CPT | Performed by: PHYSICIAN ASSISTANT

## 2019-11-11 PROCEDURE — 87624 HPV HI-RISK TYP POOLED RSLT: CPT | Performed by: PHYSICIAN ASSISTANT

## 2019-11-11 PROCEDURE — G0145 SCR C/V CYTO,THINLAYER,RESCR: HCPCS | Performed by: PHYSICIAN ASSISTANT

## 2019-11-11 PROCEDURE — 87210 SMEAR WET MOUNT SALINE/INK: CPT | Performed by: PHYSICIAN ASSISTANT

## 2019-11-11 RX ORDER — FLUCONAZOLE 150 MG/1
150 TABLET ORAL ONCE
Qty: 1 TABLET | Refills: 0 | Status: SHIPPED | OUTPATIENT
Start: 2019-11-11 | End: 2019-11-11

## 2019-11-11 NOTE — LETTER
November 15, 2019    Karol Pires  [address]  [place] [address]    Dear ,  This letter is regarding your recent Pap smear (cervical cancer screening) and Human Papillomavirus (HPV) test.  We are happy to inform you that your Pap smear result is normal. Cervical cancer is closely linked with certain types of HPV. Your results showed no evidence of high-risk HPV.  We recommend you have your next PAP smear and HPV test in 5 years.  You will still need to return to the clinic every year for an annual exam and other preventive tests.  If you have additional questions regarding this result, please call our registered nurse, Betsy at [phone].  Sincerely,    Loan Hodges PA-C/cee

## 2019-11-13 LAB
COPATH REPORT: NORMAL
PAP: NORMAL

## 2019-11-14 LAB
FINAL DIAGNOSIS: NORMAL
HPV HR 12 DNA CVX QL NAA+PROBE: NEGATIVE
HPV16 DNA SPEC QL NAA+PROBE: NEGATIVE
HPV18 DNA SPEC QL NAA+PROBE: NEGATIVE
SPECIMEN DESCRIPTION: NORMAL
SPECIMEN SOURCE CVX/VAG CYTO: NORMAL

## 2020-10-01 ENCOUNTER — OFFICE VISIT (OUTPATIENT)
Dept: OPHTHALMOLOGY | Facility: CLINIC | Age: 45
End: 2020-10-01
Attending: OPHTHALMOLOGY
Payer: COMMERCIAL

## 2020-10-01 DIAGNOSIS — H25.13 AGE-RELATED NUCLEAR CATARACT OF BOTH EYES: Primary | ICD-10-CM

## 2020-10-01 DIAGNOSIS — H52.4 PRESBYOPIA: ICD-10-CM

## 2020-10-01 PROCEDURE — 92015 DETERMINE REFRACTIVE STATE: CPT

## 2020-10-01 PROCEDURE — G0463 HOSPITAL OUTPT CLINIC VISIT: HCPCS

## 2020-10-01 PROCEDURE — 92004 COMPRE OPH EXAM NEW PT 1/>: CPT | Mod: GC | Performed by: OPHTHALMOLOGY

## 2020-10-01 ASSESSMENT — VISUAL ACUITY
OD_SC: 20/20
OS_SC: 20/20
METHOD: SNELLEN - LINEAR
OS_SC+: -2

## 2020-10-01 ASSESSMENT — TONOMETRY
IOP_METHOD: TONOPEN
OD_IOP_MMHG: 20
OS_IOP_MMHG: 20

## 2020-10-01 ASSESSMENT — REFRACTION_MANIFEST
OS_ADD: +2.50
OS_CYLINDER: SPHERE
OD_SPHERE: PLANO
OS_SPHERE: -0.25
OD_ADD: +2.50

## 2020-10-01 ASSESSMENT — SLIT LAMP EXAM - LIDS
COMMENTS: NORMAL
COMMENTS: NORMAL

## 2020-10-01 ASSESSMENT — CUP TO DISC RATIO
OD_RATIO: 0.15
OS_RATIO: 0.25

## 2020-10-01 ASSESSMENT — CONF VISUAL FIELD
OS_NORMAL: 1
OD_NORMAL: 1

## 2020-10-01 ASSESSMENT — EXTERNAL EXAM - RIGHT EYE: OD_EXAM: NORMAL

## 2020-10-01 ASSESSMENT — EXTERNAL EXAM - LEFT EYE: OS_EXAM: NORMAL

## 2020-10-01 NOTE — NURSING NOTE
Chief Complaints and History of Present Illnesses   Patient presents with     Annual Eye Exam     Chief Complaint(s) and History of Present Illness(es)     Annual Eye Exam     Laterality: both eyes    Associated symptoms: floaters.  Negative for eye pain, glare, haloes and flashes    Treatments tried: no treatments    Pain scale: 0/10              Comments     Routine exam  No problems w distance vision  Wear OTC  reading glasses  Yuliet HAYDEN 9:43 AM October 1, 2020

## 2020-10-01 NOTE — PROGRESS NOTES
Chief Complaint(s) and History of Present Illness(es)     Annual Eye Exam     Laterality: both eyes    Associated symptoms: floaters.  Negative for eye pain, glare, haloes and   flashes    Treatments tried: no treatments    Pain scale: 0/10          Comments     Routine exam  No problems w distance vision  Wear OTC  reading glasses  Yuliet Mario CATRACHO 9:43 AM October 1, 2020      Patient is a 44 y/o F with a hx of presbyopia, and regular astigmatism.      Patients reports that she is having difficulties seeing with her current glasses (she is having difficulty reading up close) - she reports that this improves with glass usage. This phenomenon started 2 years ago.     No eye pain. No eye irritation. No redness, or discharge. Has occasional pruritis in the eyes. No flashes of lights, no floaters in the eyes - has a hx of this, although this resolved. No other ocular concerns today. Not bothered by lights / glares. No problems driving at nightime.     POHx: No hx of ocular dx. No eye surgeries. No glass usage growing up.  PMHx: No  Gtt: No  FHx: No FHx of glaucoma, Macular degeneration. Hx of cataract in mom.      Review of systems for the eyes was negative other than the pertinent positives/negatives listed in the HPI.      Assessment & Plan      Karol Pires is a 45 year old female with the following diagnoses:   1. Age-related nuclear cataract of both eyes    2. Presbyopia       Healthy dilated fundus exam   Continue to monitor cataracts in both eyes.   Refractive options reviewed  Refraction given   Discussed symptoms of retinal tear/detachment and the need to be seen urgently should they occur     Patient disposition:   Return in about 1 year (around 10/1/2021) for DFE.    Gómez Vale MD  Department of Ophthalmology  Pager: [phone]    Attending Physician Attestation:  Complete documentation of historical and exam elements from today's encounter can be found in the full encounter summary report  (not reduplicated in this progress note).  I personally obtained the chief complaint(s) and history of present illness.  I confirmed and edited as necessary the review of systems, past medical/surgical history, family history, social history, and examination findings as documented by others; and I examined the patient myself.  I personally reviewed the relevant tests, images, and reports as documented above.  I formulated and edited as necessary the assessment and plan and discussed the findings and management plan with the patient and family. . - Mac Lee MD

## 2021-06-02 ENCOUNTER — TELEPHONE (OUTPATIENT)
Dept: FAMILY MEDICINE | Facility: CLINIC | Age: 46
End: 2021-06-02

## 2021-06-02 NOTE — TELEPHONE ENCOUNTER
Pt came in to the clinic asking for Michael Anthony to place a order for her to have blood drawn for a mantoux test for school and employer, she states she has a bad reaction to the arm mantoux test, she would like to avoid it if possible.    Please advise.     Fe Silva, [place]  Staff

## 2021-06-03 ENCOUNTER — VIRTUAL VISIT (OUTPATIENT)
Dept: FAMILY MEDICINE | Facility: CLINIC | Age: 46
End: 2021-06-03
Payer: COMMERCIAL

## 2021-06-03 DIAGNOSIS — Z11.1 SCREENING EXAMINATION FOR PULMONARY TUBERCULOSIS: Primary | ICD-10-CM

## 2021-06-03 DIAGNOSIS — R76.11 POSITIVE PPD: ICD-10-CM

## 2021-06-03 PROCEDURE — 99213 OFFICE O/P EST LOW 20 MIN: CPT | Mod: TEL | Performed by: PHYSICIAN ASSISTANT

## 2021-06-03 NOTE — PROGRESS NOTES
Karol is a 46 year old who is being evaluated via a billable telephone visit.      What phone number would you like to be contacted at? [phone]  How would you like to obtain your AVS? none    Assessment & Plan       ICD-10-CM    1. Screening examination for pulmonary tuberculosis  Z11.1 Quantiferon TB Gold Plus   2. Positive PPD  R76.11    Talk to patient about her concerns.  Labs are pending.  Follow-up with depend on the lab results.  Warning signs were discussed.      Return in about 1 year (around 6/3/2022) for recheck, As Needed.    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   positve skin test in the past.   Was given BCG.   No symptoms.  She denies any recent colds or coughs or fevers.  She denies any weight gain or night sweats.  Neg x-ray in the past.     Review of Systems   Constitutional, HEENT, cardiovascular, pulmonary, gi and gu systems are negative, except as otherwise noted.      Objective           Vitals:  No vitals were obtained today due to virtual visit.    Physical Exam   healthy, alert and no distress  PSYCH: Alert and oriented times 3; coherent speech, normal   rate and volume, able to articulate logical thoughts, able   to abstract reason, no tangential thoughts, no hallucinations   or delusions  Her affect is normal  RESP: No cough, no audible wheezing, able to talk in full sentences  Remainder of exam unable to be completed due to telephone visits    Labs pending            Phone call duration: 5 minutes

## 2021-06-08 DIAGNOSIS — Z11.1 SCREENING EXAMINATION FOR PULMONARY TUBERCULOSIS: ICD-10-CM

## 2021-06-08 PROCEDURE — 86481 TB AG RESPONSE T-CELL SUSP: CPT | Performed by: PHYSICIAN ASSISTANT

## 2021-06-08 PROCEDURE — 36415 COLL VENOUS BLD VENIPUNCTURE: CPT | Performed by: PHYSICIAN ASSISTANT

## 2021-06-08 NOTE — TELEPHONE ENCOUNTER
Reason for Call:  Form, our goal is to have forms completed with 72 hours, however, some forms may require a visit or additional information.    Type of letter, form or note:  school     Who is the form from?: Patient    Where did the form come from: Patient or family brought in       What clinic location was the form placed at?: [place]    Where the form was placed: Given to MA/RN    What number is listed as a contact on the form?:        Additional comments:     Call taken on 6/8/2021 at 10:40 AM by Marsha Carter

## 2021-06-09 NOTE — TELEPHONE ENCOUNTER
Called the patient @ [phone]. Advised per WILLIAM Cruz she will need a physical in order for the provider to fill out her forms(s) she had dropped off. Asked to call our scheduling line for assistance.  Christine Beck

## 2021-06-10 LAB
GAMMA INTERFERON BACKGROUND BLD IA-ACNC: 0 IU/ML
M TB IFN-G CD4+ BCKGRND COR BLD-ACNC: 10 IU/ML
M TB TUBERC IFN-G BLD QL: NEGATIVE
MITOGEN IGNF BCKGRD COR BLD-ACNC: 0.07 IU/ML
MITOGEN IGNF BCKGRD COR BLD-ACNC: 0.2 IU/ML

## 2021-07-02 ENCOUNTER — OFFICE VISIT (OUTPATIENT)
Dept: FAMILY MEDICINE | Facility: CLINIC | Age: 46
End: 2021-07-02
Payer: COMMERCIAL

## 2021-07-02 VITALS
WEIGHT: 213 LBS | OXYGEN SATURATION: 96 % | DIASTOLIC BLOOD PRESSURE: 85 MMHG | HEART RATE: 85 BPM | BODY MASS INDEX: 35.45 KG/M2 | SYSTOLIC BLOOD PRESSURE: 124 MMHG

## 2021-07-02 DIAGNOSIS — Z23 ENCOUNTER FOR IMMUNIZATION: ICD-10-CM

## 2021-07-02 DIAGNOSIS — Z01.84 IMMUNITY STATUS TESTING: Primary | ICD-10-CM

## 2021-07-02 DIAGNOSIS — Z23 NEED FOR TDAP VACCINATION: ICD-10-CM

## 2021-07-02 PROCEDURE — 90472 IMMUNIZATION ADMIN EACH ADD: CPT | Performed by: NURSE PRACTITIONER

## 2021-07-02 PROCEDURE — 86762 RUBELLA ANTIBODY: CPT | Performed by: NURSE PRACTITIONER

## 2021-07-02 PROCEDURE — 86765 RUBEOLA ANTIBODY: CPT | Performed by: NURSE PRACTITIONER

## 2021-07-02 PROCEDURE — 92551 PURE TONE HEARING TEST AIR: CPT | Performed by: NURSE PRACTITIONER

## 2021-07-02 PROCEDURE — 99000 SPECIMEN HANDLING OFFICE-LAB: CPT | Performed by: NURSE PRACTITIONER

## 2021-07-02 PROCEDURE — 90715 TDAP VACCINE 7 YRS/> IM: CPT | Performed by: NURSE PRACTITIONER

## 2021-07-02 PROCEDURE — 90471 IMMUNIZATION ADMIN: CPT | Performed by: NURSE PRACTITIONER

## 2021-07-02 PROCEDURE — 86735 MUMPS ANTIBODY: CPT | Mod: 90 | Performed by: NURSE PRACTITIONER

## 2021-07-02 PROCEDURE — 99214 OFFICE O/P EST MOD 30 MIN: CPT | Mod: 25 | Performed by: NURSE PRACTITIONER

## 2021-07-02 PROCEDURE — 90746 HEPB VACCINE 3 DOSE ADULT IM: CPT | Performed by: NURSE PRACTITIONER

## 2021-07-02 PROCEDURE — 36415 COLL VENOUS BLD VENIPUNCTURE: CPT | Performed by: NURSE PRACTITIONER

## 2021-07-02 NOTE — PATIENT INSTRUCTIONS
Please bring form up to the clinic and I will complete it for you.     Tetanus (Tdap) vaccine given today.     Hepatitis B vaccine series started today.     Checking labs for immunity to measles, mumps and rubella.  Your are immune to chicken pox.  Your TB gold test was negative.

## 2021-07-02 NOTE — LETTER
July 6, 2021      Karol Pires  [address]  [place] [address]        Dear ,    We are writing to inform you of your test results.    Labs show that she is immune to rubella, mumps and rubeola.     Resulted Orders   Mumps Immune Status, IgG   Result Value Ref Range    Mumps Antibody IgG 3.3 (H) 0.0 - 0.8 AI      Comment:      Positive, suggests prev. exposure and probable immunity  Antibody index (AI) values reflect qualitative changes in antibody   concentration that cannot be directly associated with clinical condition or   disease state.     Rubella Antibody IgG Quantitative   Result Value Ref Range    Rubella Antibody IgG Quantitative 76 IU/mL      Comment:      Positive.  Suggests previous exposure or immunization and probable immunity  Reference Range:    Unvaccinated Negative 0-7 IU/mL  Vaccinated or previous exposure Positive 10 IU/ml or greater     Rubeola Antibody IgG   Result Value Ref Range    Rubeola (Measles) Antibody IgG 3.4 (H) 0.0 - 0.8 AI      Comment:      Positive, suggests prev. exposure and probable immunity  Antibody index (AI) values reflect qualitative changes in antibody   concentration that cannot be directly associated with clinical condition or   disease state.         If you have any questions or concerns, please call the clinic at the number listed above.       Sincerely,      Marsha Mahmood, CNP

## 2021-07-02 NOTE — PROGRESS NOTES
Assessment & Plan     Immunity status testing  - Mumps Immune Status, IgG  - Rubella Antibody IgG Quantitative  - Rubeola Antibody IgG    Need for Tdap vaccination  - TDAP VACCINE (Adacel, Boostrix)  [[id number]]    Encounter for immunization  - HEPATITIS B VACCINE, ADULT, IM     She will return form for me to complete.     Return in about 3 days (around 7/5/2021) for other- return paper work.    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   Had positive mantoux, hx of BCG vaccine.  TB gold is negative.   Had labs in 2019 showing immunity to varicella zoster.  Was not immune to hepatitis B, no vaccines since then.  Has not been checked for MMR immunity.    Needs Tdap updated.     Patient denies any health concerns and is on no chronic medications.        Patient forgot to bring form with her, will drop off at the clinic at a later time.         Review of Systems   Constitutional, HEENT, cardiovascular, pulmonary, gi and gu systems are negative, except as otherwise noted.      Objective    /85   Pulse 85   Wt 96.6 kg (213 lb)   SpO2 96%   BMI 35.45 kg/m    Body mass index is 35.45 kg/m .  Physical Exam   GENERAL: healthy, alert and no distress  EYES: Eyes grossly normal to inspection, PERRL and conjunctivae and sclerae normal  HENT: ear canals and TM's normal, nose and mouth without ulcers or lesions  NECK: no adenopathy, no asymmetry, masses, or scars and thyroid normal to palpation  RESP: lungs clear to auscultation - no rales, rhonchi or wheezes  CV: regular rate and rhythm, normal S1 S2, no S3 or S4, no murmur, click or rub, no peripheral edema and peripheral pulses strong  ABDOMEN: soft, nontender, no hepatosplenomegaly, no masses and bowel sounds normal  MS: no gross musculoskeletal defects noted, no edema  SKIN: no suspicious lesions or  rashes  NEURO: Normal strength and tone, mentation intact and speech normal  PSYCH: mentation appears normal, affect normal/bright      VISION   No corrective lenses  Tool used: Franco   Right eye:        10/10 (20/20)  Left eye:          10/10 (20/20)  Visual Acuity: Pass        HEARING FREQUENCY     Right Ear:      1000 Hz RESPONSE- on Level: 40 db (Conditioning sound)   1000 Hz: RESPONSE- on Level:   20 db    2000 Hz: RESPONSE- on Level:   20 db    4000 Hz: RESPONSE- on Level:   20 db      Left Ear:      4000 Hz: RESPONSE- on Level:   20 db    2000 Hz: RESPONSE- on Level:   20 db    1000 Hz: RESPONSE- on Level:   20 db     500 Hz: RESPONSE- on Level: 25 db     Right Ear:    500 Hz: RESPONSE- on Level: 25 db     Hearing Acuity: Pass     Hearing Assessment: normal           Labs reviewed in Epic.          Hepatitis B Surface Antibody  Hepatitis B Surface Antibody  Collected: 07/18/19 1038   Result status: Final   Resulting lab: [facility]   Reference range: <8.00 m[IU]/mL   Value: 6.51   Comment: Nonreactive, No antibody detected when the value is less than 8.00 m[IU]/mL.       Varicella Zoster Virus Antibody IgG  Order: [id number]  Status:  Final result   Visible to patient:  No (inaccessible in MyChart) Dx:  Immunity status testing    Ref Range & Units 1yr ago    Varicella Zoster Virus Antibody IgG 0.0 - 0.8 AI 2.9High     Comment: Positive, suggests prev. exposure and probable immunity   Antibody index (AI) values reflect qualitative changes in antibody   concentration that cannot be directly associated with clinical condition or   disease state.               MTB Quantiferon Result  Quantiferon TB Gold Plus  Collected: 06/08/21 1023   Result status: Final   Resulting lab: [facility]   Reference range: NEG^Negative   Value: Negative   Comment: No interferon gamma response to M.tuberculosis antigens was detected.   Infection with M.tuberculosis is  unlikely, however a single negative result   does not exclude infection. In patients at high risk for infection, a second   test should be considered    *Additional information available - comment

## 2021-07-05 LAB
MEV IGG SER QL IA: 3.4 AI (ref 0–0.8)
MUV IGG SER QL IA: 3.3 AI (ref 0–0.8)
RUBV IGG SERPL IA-ACNC: 76 IU/ML

## 2021-07-06 ENCOUNTER — TELEPHONE (OUTPATIENT)
Dept: FAMILY MEDICINE | Facility: CLINIC | Age: 46
End: 2021-07-06

## 2021-07-06 NOTE — TELEPHONE ENCOUNTER
Reason for Call:  Form, our goal is to have forms completed with 72 hours, however, some forms may require a visit or additional information.    Type of letter, form or note:  medical    Who is the form from?: Patient    Where did the form come from: Patient or family brought in       What clinic location was the form placed at?: [place]    Where the form was placed: [facility]    What number is listed as a contact on the form?: [phone]       Additional comments: Call pt when complete    Call taken on 7/6/2021 at 5:34 PM by Tacho Zimmer

## 2021-07-07 NOTE — TELEPHONE ENCOUNTER
Form signed and placed in TC basket.   She will need print out of her immunization record to show recently updated Tdap.   Also will need print out of labs 7/18/2019 titers for hepatitis B and varicella, 6/8/21 TB gold test, and 7/2/21 titers for measles, rubella, rubeola.    Thank you. Marsha Mahmood, CNP

## 2021-07-07 NOTE — TELEPHONE ENCOUNTER
Forms, titers and immunization record brought to the  for . Called and informed patient.Chelsea Foreman MA/TC

## 2021-07-14 ENCOUNTER — TELEPHONE (OUTPATIENT)
Dept: FAMILY MEDICINE | Facility: CLINIC | Age: 46
End: 2021-07-14

## 2021-07-14 ENCOUNTER — TELEPHONE (OUTPATIENT)
Dept: INTERNAL MEDICINE | Facility: CLINIC | Age: 46
End: 2021-07-14

## 2021-07-14 NOTE — TELEPHONE ENCOUNTER
Pt want know if she needs to get the Hepatitis B Booster?  She would like the provider to write a note stating if she needs it or not.  If not please write the date on when she had it done.  Any Q's call pt at [phone].  Thank You

## 2021-08-13 ENCOUNTER — ALLIED HEALTH/NURSE VISIT (OUTPATIENT)
Dept: NURSING | Facility: CLINIC | Age: 46
End: 2021-08-13
Payer: COMMERCIAL

## 2021-08-13 DIAGNOSIS — Z23 NEED FOR VACCINATION: Primary | ICD-10-CM

## 2021-08-13 PROCEDURE — 90471 IMMUNIZATION ADMIN: CPT

## 2021-08-13 PROCEDURE — 90746 HEPB VACCINE 3 DOSE ADULT IM: CPT

## 2021-08-13 NOTE — LETTER
[facility]  [address] [facility] [address]  Phone: [phone]    08/13/21    Karol Buchanan [place]  [address] [facility] [address]      To whom it may concern:     Karol received her first Hepatitis B vaccine on 7/2/21 and 2nd dose on 8/13/21. She will get her 3rd dose when able after 2/13/22.     Sincerely,      Marsha Mahmood

## 2021-09-19 ENCOUNTER — HEALTH MAINTENANCE LETTER (OUTPATIENT)
Age: 46
End: 2021-09-19

## 2022-01-07 ENCOUNTER — OFFICE VISIT (OUTPATIENT)
Dept: URGENT CARE | Facility: URGENT CARE | Age: 47
End: 2022-01-07
Payer: COMMERCIAL

## 2022-01-07 VITALS
SYSTOLIC BLOOD PRESSURE: 168 MMHG | OXYGEN SATURATION: 100 % | HEART RATE: 69 BPM | WEIGHT: 207.7 LBS | TEMPERATURE: 97.1 F | DIASTOLIC BLOOD PRESSURE: 98 MMHG | BODY MASS INDEX: 34.56 KG/M2

## 2022-01-07 DIAGNOSIS — R03.0 ELEVATED BLOOD PRESSURE READING WITHOUT DIAGNOSIS OF HYPERTENSION: ICD-10-CM

## 2022-01-07 DIAGNOSIS — J06.9 VIRAL URI: Primary | ICD-10-CM

## 2022-01-07 DIAGNOSIS — R05.9 COUGH: ICD-10-CM

## 2022-01-07 LAB — SARS-COV-2 RNA RESP QL NAA+PROBE: POSITIVE

## 2022-01-07 PROCEDURE — U0005 INFEC AGEN DETEC AMPLI PROBE: HCPCS | Performed by: NURSE PRACTITIONER

## 2022-01-07 PROCEDURE — 99214 OFFICE O/P EST MOD 30 MIN: CPT | Performed by: NURSE PRACTITIONER

## 2022-01-07 PROCEDURE — U0003 INFECTIOUS AGENT DETECTION BY NUCLEIC ACID (DNA OR RNA); SEVERE ACUTE RESPIRATORY SYNDROME CORONAVIRUS 2 (SARS-COV-2) (CORONAVIRUS DISEASE [COVID-19]), AMPLIFIED PROBE TECHNIQUE, MAKING USE OF HIGH THROUGHPUT TECHNOLOGIES AS DESCRIBED BY CMS-2020-01-R: HCPCS | Performed by: NURSE PRACTITIONER

## 2022-01-07 RX ORDER — OMEGA-3 FATTY ACIDS/FISH OIL 300-1000MG
200 CAPSULE ORAL EVERY 4 HOURS PRN
COMMUNITY
End: 2022-07-22

## 2022-01-07 NOTE — PROGRESS NOTES
Assessment & Plan     Viral URI    Cough    - Symptomatic; Yes; 1/3/2022 COVID-19 Virus (Coronavirus) by PCR Nose    Elevated blood pressure reading without diagnosis of hypertension       COVID testing in process, will notify if positive. Discussed symptoms likely viral in nature and antibiotic not indicated. Recommend rest, fluids, tylenol, motrin, Mucinex, Delsym as needed, nasal saline, humidifier. Self-quarantine recommended. She declines influenza testing. Chest xray not indicated with clear lung sounds, oxygen 100%, no shortness of breath.     Patient is hypertensive today but asymptomatic.  No headache, blurring of vision, chest pain, shortness of breath, dizziness, numbness, or weakness. Second BP reading was also above goal.  Patient instructed to follow up with PCP within the next week for BP recheck.  Instructed to go to emergency department if develops chest pain, shortness of breath, dizziness, vision changes, numbness, weakness.     Follow-up with PCP if symptoms persist for 7 days, and sooner if symptoms worsen or new symptoms develop.     Discussed red flag symptoms which warrant immediate visit in emergency room    All questions were answered and patient verbalized understanding. AVS reviewed with patient.     Elizabeth Coello, DNP, APRN, CNP 1/7/2022 6:08 PM  [facility] URGENT CARE [facility]          Omar Roberson is a 46 year old female who presents to clinic today for the following health issues:  Chief Complaint   Patient presents with     Cough     cough, runny nose and fever x 4-5 days- exposed to Covid this week      Patient presents for evaluation of cough. Associated symptoms: runny nose, fever, chills, headache, body aches. Temp has been 101F. She was exposed to COVID this week 3-4 days ago. She had shortness of breath which resolved.  Symptoms have been present for 4-5 days. Denies wheezing, nausea, emesis, sore throat. She has been vaccinated for COVID and influenza.  She has been taking tylenol, ibuprofen, Mucinex which helps temporarily. Her son has similar symptoms.     Problem list, Medication list, Allergies, and Medical history reviewed in EPIC.    ROS:  Review of systems negative except for noted above        Objective    BP (!) 168/98   Pulse 69   Temp 97.1  F (36.2  C) (Tympanic)   Wt 94.2 kg (207 lb 11.2 oz)   SpO2 100%   BMI 34.56 kg/m    Physical Exam  Constitutional:       General: She is not in acute distress.     Appearance: She is not toxic-appearing or diaphoretic.   HENT:      Head: Normocephalic and atraumatic.      Right Ear: Tympanic membrane, ear canal and external ear normal.      Left Ear: Tympanic membrane, ear canal and external ear normal.      Nose:      Right Sinus: No maxillary sinus tenderness or frontal sinus tenderness.      Left Sinus: No maxillary sinus tenderness or frontal sinus tenderness.      Comments: Mild nasal congestion     Mouth/Throat:      Mouth: Mucous membranes are moist.      Pharynx: Oropharynx is clear. No oropharyngeal exudate or posterior oropharyngeal erythema.      Tonsils: No tonsillar abscesses. 0 on the right. 0 on the left.   Eyes:      Conjunctiva/sclera: Conjunctivae normal.   Cardiovascular:      Rate and Rhythm: Normal rate and regular rhythm.      Heart sounds: Normal heart sounds.   Pulmonary:      Effort: Pulmonary effort is normal. No respiratory distress.      Breath sounds: Normal breath sounds. No wheezing, rhonchi or rales.   Lymphadenopathy:      Cervical: No cervical adenopathy.   Skin:     General: Skin is warm and dry.   Neurological:      Mental Status: She is alert.

## 2022-01-07 NOTE — LETTER
January 7, 2022      Karol Pires  [address]  [place] [address]        To Whom It May Concern:    Karol Pires  was seen on 1/7/22.  Please excuse her until symptoms improving for 24-hours, fever-free for 24-hours, and COVID results available.        Sincerely,        KELLY Johnson

## 2022-01-08 NOTE — PATIENT INSTRUCTIONS
"  Patient Education   After Your COVID-19 (Coronavirus) Test  You have been tested for COVID-19 (coronavirus).   If you'll have surgery in the next few days, we'll let you know ahead of time if you have the virus. Please call your surgeon's office with any questions.  For all other patients: Results are usually available in [employer] within 2 to 3 days.   If you do not have a [employer] account, you'll get a letter in the mail in about 7 to 10 days.   [email or web address] is often the fastest way to get test results. Please sign up if you do not already have a [employer] account. See the handout Getting COVID-19 Test Results in [employer] for help.  What if my test result is positive?  If your test is positive and you have not viewed your result in [employer], you'll get a phone call with your result. (A positive test means that you have the virus.)     Follow the tips under \"How do I self-isolate?\" below for 10 days (20 days if you have a weak immune system).    You don't need to be retested for COVID-19 before going back to school or work. As long as you're fever-free and feeling better, you can go back to school, work and other activities after waiting the 10 or 20 days.  What if I have questions after I get my results?  If you have questions about your results, please visit our testing website at [email or web address].   After 7 to 10 days, if you have not gotten your results:     Call [phone] ([phone]) and ask to speak with our COVID-19 results team.    If you're being treated at an infusion center: Call your infusion center directly.  What are the symptoms of COVID-19?  Cough, fever and trouble breathing are the most common signs of COVID-19.  Other symptoms can include new headaches, new muscle or body aches, new and unexplained fatigue (feeling very tired), chills, sore throat, congestion (stuffy or runny nose), diarrhea (loose poop), loss of taste or smell, belly pain, and nausea or vomiting " "(feeling sick to your stomach or throwing up).  You may already have symptoms of COVID-19, or they may show up later.  What should I do if I have symptoms?  If you're having surgery: Call your surgeon's office.  For all other patients: Stay home and away from others (self-isolate) until ...    You've had no fever--and no medicine that reduces fever--for 1 full day (24 hours), AND    Other symptoms have gotten better. For example, your cough or breathing has improved, AND    At least 10 days have passed since your symptoms first started.  How do I self-isolate?    Stay in your own room, even for meals. Use your own bathroom if you can.    Stay away from others in your home. No hugging, kissing or shaking hands. No visitors.    Don't go to work, school or anywhere else.    Clean \"high touch\" surfaces often (doorknobs, counters, handles). Use household cleaning spray or wipes. You'll find a full list of  on the EPA website: www.epa.gov/pesticide-registration/list-n-disinfectants-use-against-sars-cov-2.    Cover your mouth and nose with a mask or other face covering to avoid spreading germs.    Wash your hands and face often. Use soap and water.    Caregivers in these groups are at risk for severe illness due to COVID-19:  ? People 65 years and older  ? People who live in a nursing home or long-term care facility  ? People with chronic disease (lung, heart, cancer, diabetes, kidney, liver, immunologic)  ? People who have a weakened immune system, including those who:    Are in cancer treatment    Take medicine that weakens the immune system, such as corticosteroids    Had a bone marrow or organ transplant    Have an immune deficiency    Have poorly controlled HIV or AIDS    Are obese (body mass index of 40 or higher)    Smoke regularly    Caregivers should wear gloves while washing dishes, handling laundry and cleaning bedrooms and bathrooms.    Use caution when washing and drying laundry: Don't shake dirty " laundry and use the warmest water setting that you can.    For more tips on managing your health at home, go to www.cdc.gov/coronavirus/2019-ncov/downloads/10Things.pdf.  How can I take care of myself at home?  1. Get lots of rest. Drink extra fluids (unless a doctor has told you not to).  2. Take Tylenol (acetaminophen) for fever or pain. If you have liver or kidney problems, ask your family doctor if it's OK to take Tylenol.   Adults can take either:  ? 650 mg (two 325 mg pills) every 4 to 6 hours, or   ? 1,000 mg (two 500 mg pills) every 8 hours as needed.  ? Note: Don't take more than 3,000 mg in one day. Acetaminophen is found in many medicines (both prescribed and over-the-counter medicines). Read all labels to be sure you don't take too much.   For children, check the Tylenol bottle for the right dose. The dose is based on the child's age or weight.  3. If you have other health problems (like cancer, heart failure, an organ transplant or severe kidney disease): Call your specialty clinic if you don't feel better in the next 2 days.  4. Know when to call 911. Emergency warning signs include:  ? Trouble breathing or shortness of breath  ? Chest pain or pressure that doesn't go away  ? Feeling confused like you haven't felt before, or not being able to wake up  ? Bluish-colored lips or face  5. If your doctor prescribed a blood thinner medicine: Follow their instructions.  Where can I get more information?    [facility] - About COVID-19:   [email or web address]    CDC - If You're Sick: cdc.gov/coronavirus/2019-ncov/about/steps-when-sick.html    CDC - Ending Home Isolation: www.cdc.gov/coronavirus/2019-ncov/hcp/disposition-in-home-patients.html    CDC - Caring for Someone: www.cdc.gov/coronavirus/2019-ncov/if-you-are-sick/care-for-someone.html    [facility] - Interim Guidance for Hospital Discharge to Home: www.health.[facility].[place]/diseases/coronavirus/hcp/hospdischarge.pdf    [facility]  clinical trials (COVID-19 research studies): clinicalaffairs.[facility]-clinical-trials    Below are the COVID-19 hotlines at the [place] Department of Health ([facility]). Interpreters are available.  ? For health questions: Call 777-658-7046 or [phone] (7 a.m. to 7 p.m.)  ? For questions about schools and childcare: Call 914-899-2278 or [phone] (7 a.m. to 7 p.m.)    For informational purposes only. Not to replace the advice of your health care provider. Clinically reviewed by Infection Prevention and the [facility] COVID-19 Clinical Team. Copyright   2020 [facility]. All rights reserved. SMART[employer] 640649 - Rev 11/11/20.

## 2022-01-09 ENCOUNTER — TELEPHONE (OUTPATIENT)
Dept: EMERGENCY MEDICINE | Facility: CLINIC | Age: 47
End: 2022-01-09
Payer: COMMERCIAL

## 2022-01-09 NOTE — TELEPHONE ENCOUNTER
"Coronavirus (COVID-19) Notification    Caller Name (Patient, parent, daughter/son, grandparent, etc)  patient    Reason for call  Notify of Positive Coronavirus (COVID-19) lab results, assess symptoms,  review  [facility] recommendations    Lab Result    Lab test:  2019-nCoV rRt-PCR or SARS-CoV-2 PCR    Oropharyngeal AND/OR nasopharyngeal swabs is POSITIVE for 2019-nCoV RNA/SARS-COV-2 PCR (COVID-19 virus)    RN Recommendations/Instructions per [facility] Coronavirus COVID-19 recommendations    Brief introduction script  Introduce self then review script:  \"I am calling on behalf of [employer].  We were notified that your Coronavirus test (COVID-19) for was POSITIVE for the virus.  I have some information to relay to you but first I wanted to mention that the [place] Dept of Health will be contacting you shortly [it's possible MD already called Patient] to talk to you more about how you are feeling and other people you have had contact with who might now also have the virus.  Also,  [facility] is Partnering with the [facility] for Covid-19 research, you may be contacted directly by research staff.\"    Assessment (Inquire about Patient's current symptoms)   Assessment   Current Symptoms at time of phone call: (if no symptoms, document No symptoms] Headache, cough and runny nose   Symptoms onset (if applicable) 01/06/22     If at time of call, Patients symptoms hare worsened, the Patient should contact 911 or have someone drive them to Emergency Dept promptly:      If Patient calling 911, inform 911 personal that you have tested positive for the Coronavirus (COVID-19).  Place mask on and await 911 to arrive.    If Emergency Dept, If possible, please have another adult drive you to the Emergency Dept but you need to wear mask when in contact with other people.      Monoclonal Antibody Administration    You may be eligible to receive a new treatment with a monoclonal antibody for preventing " hospitalization in patients at high risk for complications from COVID-19.   This medication is still experimental and available on a limited basis; it is given through an IV and must be given at an infusion center. Please note that not all people who are eligible will receive the medication since it is in limited supply.     Are you interested in being considered for this medication?  No.   Does the patient fit the criteria: No    Review information with Patient    Your result was positive. This means you have COVID-19 (coronavirus).  We have sent you a letter that reviews the information that I'll be reviewing with you now.    How can I protect others?    If you have symptoms: stay home and away from others (self-isolate) until:    You've had no fever--and no medicine that reduces fever--for 1 full day (24 hours). And       Your other symptoms have gotten better. For example, your cough or breathing has improved. And     At least 10 days have passed since your symptoms started. (If you've been told by a doctor that you have a weak immune system, wait 20 days.)     If you don't have symptoms: Stay home and away from others (self-isolate) until at least 10 days have passed since your first positive COVID-19 test. (Date test collected)    During this time:    Stay in your own room, including for meals. Use your own bathroom if you can.    Stay away from others in your home. No hugging, kissing or shaking hands. No visitors.     Don't go to work, school or anywhere else.     Clean  high touch  surfaces often (doorknobs, counters, handles, etc.). Use a household cleaning spray or wipes. You'll find a full list on the EPA website at www.epa.gov/pesticide-registration/list-n-disinfectants-use-against-sars-cov-2.     Cover your mouth and nose with a mask, tissue or other face covering to avoid spreading germs.    Wash your hands and face often with soap and water.    Make a list of people you have been in close contact  with recently, even if either of you wore a face covering.   - Start your list from 2 days before you became ill or had a positive test.  - Include anyone that was within 6 feet of you for a cumulative total of 15 minutes or more in 24 hours. (Example: if you sat next to Dustin for 5 minutes in the morning and 10 minutes in the afternoon, then you were in close contact for 15 minutes total that day. Dustin would be added to your list.)    A public health worker will call or text you. It is important that you answer. They will ask you questions about possible exposures to COVID-19, such as people you have been in direct contact with and places you have visited.    Tell the people on your list that you have COVID-19; they should stay away from others for 14 days starting from the last time they were in contact with you (unless you are told something different from a public health worker).     Caregivers in these groups are at risk for severe illness due to COVID-19:  o People 65 years and older  o People who live in a nursing home or long-term care facility  o People with chronic disease (lung, heart, cancer, diabetes, kidney, liver, immunologic)  o People who have a weakened immune system, including those who:  - Are in cancer treatment  - Take medicine that weakens the immune system, such as corticosteroids  - Had a bone marrow or organ transplant  - Have an immune deficiency  - Have poorly controlled HIV or AIDS  - Are obese (body mass index of 40 or higher)  - Smoke regularly    Caregivers should wear gloves while washing dishes, handling laundry and cleaning bedrooms and bathrooms.    Wash and dry laundry with special caution. Don't shake dirty laundry, and use the warmest water setting you can.    If you have a weakened immune system, ask your doctor about other actions you should take.    For more tips, go to www.cdc.gov/coronavirus/2019-ncov/downloads/10Things.pdf.    You should not go back to work until you meet  the guidelines above for ending your home isolation. You don't need to be retested for COVID-19 before going back to work--studies show that you won't spread the virus if it's been at least 10 days since your symptoms started (or 20 days, if you have a weak immune system).    Employers: This document serves as formal notice of your employee's medical guidelines for going back to work. They must meet the above guidelines before going back to work in person.    How can I take care of myself?    1. Get lots of rest. Drink extra fluids (unless a doctor has told you not to).    2. Take Tylenol (acetaminophen) for fever or pain. If you have liver or kidney problems, ask your family doctor if it's okay to take Tylenol.     Take either:     650 mg (two 325 mg pills) every 4 to 6 hours, or     1,000 mg (two 500 mg pills) every 8 hours as needed.     Note: Don't take more than 3,000 mg in one day. Acetaminophen is found in many medicines (both prescribed and over-the-counter medicines). Read all labels to be sure you don't take too much.    For children, check the Tylenol bottle for the right dose (based on their age or weight).    3. If you have other health problems (like cancer, heart failure, an organ transplant or severe kidney disease): Call your specialty clinic if you don't feel better in the next 2 days.    4. Know when to call 911: Emergency warning signs include:    Trouble breathing or shortness of breath    Pain or pressure in the chest that doesn't go away    Feeling confused like you haven't felt before, or not being able to wake up    Bluish-colored lips or face    5. Sign up for [employer]. We know it's scary to hear that you have COVID-19. We want to track your symptoms to make sure you're okay over the next 2 weeks. Please look for an email from [employer]--this is a free, online program that we'll use to keep in touch. To sign up, follow the link in the email. Learn more at  [email or web address].    Where can I get more information?    [facility]: [email or web address]    Coronavirus Basics: www.health.[facility].[place]/diseases/coronavirus/basics.html    What to Do If You're Sick: www.cdc.gov/coronavirus/2019-ncov/about/steps-when-sick.html    Ending Home Isolation: www.cdc.gov/coronavirus/2019-ncov/hcp/disposition-in-home-patients.html     Caring for Someone with COVID-19: www.cdc.gov/coronavirus/2019-ncov/if-you-are-sick/care-for-someone.html     [facility] clinical trials (COVID-19 research studies): clinicalaffairs.[facility]/n-clinical-trials     A Positive COVID-19 letter will be sent via [employer] or the mail. (Exception, no letters sent to Presurgerical/Preprocedure Patients)    Ruth Ann Nevarez LPN

## 2022-03-29 ENCOUNTER — OFFICE VISIT (OUTPATIENT)
Dept: URGENT CARE | Facility: URGENT CARE | Age: 47
End: 2022-03-29
Payer: COMMERCIAL

## 2022-03-29 VITALS
WEIGHT: 206.3 LBS | BODY MASS INDEX: 34.33 KG/M2 | SYSTOLIC BLOOD PRESSURE: 145 MMHG | HEART RATE: 86 BPM | TEMPERATURE: 98 F | OXYGEN SATURATION: 99 % | DIASTOLIC BLOOD PRESSURE: 91 MMHG

## 2022-03-29 DIAGNOSIS — R53.81 MALAISE: ICD-10-CM

## 2022-03-29 DIAGNOSIS — R19.7 DIARRHEA, UNSPECIFIED TYPE: Primary | ICD-10-CM

## 2022-03-29 DIAGNOSIS — R50.9 LOW GRADE FEVER: ICD-10-CM

## 2022-03-29 PROCEDURE — U0003 INFECTIOUS AGENT DETECTION BY NUCLEIC ACID (DNA OR RNA); SEVERE ACUTE RESPIRATORY SYNDROME CORONAVIRUS 2 (SARS-COV-2) (CORONAVIRUS DISEASE [COVID-19]), AMPLIFIED PROBE TECHNIQUE, MAKING USE OF HIGH THROUGHPUT TECHNOLOGIES AS DESCRIBED BY CMS-2020-01-R: HCPCS | Performed by: NURSE PRACTITIONER

## 2022-03-29 PROCEDURE — U0005 INFEC AGEN DETEC AMPLI PROBE: HCPCS | Performed by: NURSE PRACTITIONER

## 2022-03-29 PROCEDURE — 99213 OFFICE O/P EST LOW 20 MIN: CPT | Performed by: NURSE PRACTITIONER

## 2022-03-30 LAB — SARS-COV-2 RNA RESP QL NAA+PROBE: NEGATIVE

## 2022-03-30 NOTE — PATIENT INSTRUCTIONS
Patient Education     Treating Diarrhea  Diarrhea happens when you have loose, watery, or frequent bowel movements. It is a common problem with many causes. Most cases of diarrhea clear up on their own. But certain cases may need treatment. Be sure to see your healthcare provider if your symptoms don't get better in a few days.   Getting relief  Treatment of diarrhea depends on its cause. Diarrhea caused by bacterial or parasite infection is often treated with antibiotics. Diarrhea caused by other factors, such as a stomach virus, often improves with simple home treatment. The tips below may also help ease your symptoms.       Drink plenty of fluids. This helps prevent too much fluid loss (dehydration). Water, clear soups, and electrolyte solutions are good choices. Don't take alcohol, coffee, tea, or milk. These can irritate your intestines and make symptoms worse.    Suck on ice chips if drinking makes you queasy.    Return to your normal diet slowly. You may want to eat bland foods at first, such as rice and toast. Also, you may need to stay away from certain foods for a while, such as dairy products. These can make symptoms worse. Ask your healthcare provider if there are any other foods you should stay away from.    If you were prescribed antibiotics, take them as directed.    Don't take anti-diarrhea medicines without asking your provider first.  Call your healthcare provider   Call your healthcare provider if you have any of the following:      A fever of 100.4  F ( 38.0 C) or higher, or as directed by your provider    Chills    Severe pain    Worsening diarrhea or diarrhea for more than 2 days    Bloody vomit or stool    Signs of dehydration (dizziness, dry mouth and tongue, rapid pulse, dark urine)  [employer] last reviewed this educational content on 6/1/2019 2000-2021 [employer]. All rights reserved. This information is not intended as a substitute for professional medical care. Always  follow your healthcare professional's instructions.

## 2022-03-30 NOTE — PROGRESS NOTES
Assessment & Plan      Diagnosis Comments   1. Diarrhea, unspecified type  Symptomatic; Yes; 3/27/2022 COVID-19 Virus (Coronavirus) by PCR Nose    2. Malaise  Symptomatic; Yes; 3/27/2022 COVID-19 Virus (Coronavirus) by PCR Nose    3. Low grade fever  Symptomatic; Yes; 3/27/2022 COVID-19 Virus (Coronavirus) by PCR Nose      Will continue bland diet, rest, fluids, quarantine, return to clinic if cough worsens or fever. Lungs clear today.      Rocio Lowry, KATARINA CNP  [facility] CARE [facility]    Omar Roberson is a 46 year old female who presents to clinic today for the following health issues:  Chief Complaint   Patient presents with     Cough     Headache, started on Sunday      Pharyngitis     Diarrhea     Started on Monday     Chills     HPI    Patient states she started to have a headache on Sunday she is chilled, blood pressure is up today states this is not normal for her. She started to have diarrhea 3-5 times per day. She is keeping fluids down she is urinating well. Denies vomiting.   She is concerned for covid.   She works in health care.     URI Adult    Onset of symptoms was 3 day(s) ago.  Course of illness is improving.    Severity moderate  Current and Associated symptoms: chills, cough - non-productive, hoarse voice, body aches and diarrhea  Treatment measures tried include Tylenol/Ibuprofen.  Predisposing factors include None.        Review of Systems  Constitutional, HEENT, cardiovascular, pulmonary, gi and gu systems are negative, except as otherwise noted.      Objective    BP (!) 145/91 (BP Location: Left arm, Patient Position: Sitting, Cuff Size: Adult Regular)   Pulse 86   Temp 98  F (36.7  C) (Tympanic)   Wt 93.6 kg (206 lb 4.8 oz)   SpO2 99%   BMI 34.33 kg/m    Physical Exam   GENERAL: healthy, alert and no distress  EYES: Eyes grossly normal to inspection, PERRL and conjunctivae and sclerae normal  HENT: ear canals and TM's normal, nose and mouth without  ulcers or lesions  NECK: no adenopathy, no asymmetry, masses, or scars and thyroid normal to palpation  RESP: lungs clear to auscultation - no rales, rhonchi or wheezes  CV: regular rate and rhythm, normal S1 S2, no S3 or S4, no murmur, click or rub, no peripheral edema and peripheral pulses strong  ABDOMEN: soft, nontender, no hepatosplenomegaly, no masses and bowel sounds normal  MS: no gross musculoskeletal defects noted, no edema

## 2022-03-31 NOTE — RESULT ENCOUNTER NOTE
Please advise Karol Pires,  1975, that her lab results were neg covid  [phone] (home)   Thank you  Rocio Lowry CNP

## 2022-07-22 ENCOUNTER — OFFICE VISIT (OUTPATIENT)
Dept: URGENT CARE | Facility: URGENT CARE | Age: 47
End: 2022-07-22
Payer: COMMERCIAL

## 2022-07-22 VITALS
RESPIRATION RATE: 20 BRPM | WEIGHT: 203 LBS | TEMPERATURE: 98.1 F | BODY MASS INDEX: 33.78 KG/M2 | HEART RATE: 82 BPM | DIASTOLIC BLOOD PRESSURE: 88 MMHG | OXYGEN SATURATION: 100 % | SYSTOLIC BLOOD PRESSURE: 150 MMHG

## 2022-07-22 DIAGNOSIS — Z20.822 EXPOSURE TO COVID-19 VIRUS: ICD-10-CM

## 2022-07-22 DIAGNOSIS — G44.209 TENSION HEADACHE: ICD-10-CM

## 2022-07-22 DIAGNOSIS — Z56.6 STRESS AT WORK: Primary | ICD-10-CM

## 2022-07-22 DIAGNOSIS — F41.9 ANXIETY: ICD-10-CM

## 2022-07-22 DIAGNOSIS — G47.09 OTHER INSOMNIA: ICD-10-CM

## 2022-07-22 LAB — SARS-COV-2 RNA RESP QL NAA+PROBE: NEGATIVE

## 2022-07-22 PROCEDURE — 99215 OFFICE O/P EST HI 40 MIN: CPT | Mod: CS | Performed by: NURSE PRACTITIONER

## 2022-07-22 PROCEDURE — U0005 INFEC AGEN DETEC AMPLI PROBE: HCPCS | Performed by: NURSE PRACTITIONER

## 2022-07-22 PROCEDURE — U0003 INFECTIOUS AGENT DETECTION BY NUCLEIC ACID (DNA OR RNA); SEVERE ACUTE RESPIRATORY SYNDROME CORONAVIRUS 2 (SARS-COV-2) (CORONAVIRUS DISEASE [COVID-19]), AMPLIFIED PROBE TECHNIQUE, MAKING USE OF HIGH THROUGHPUT TECHNOLOGIES AS DESCRIBED BY CMS-2020-01-R: HCPCS | Performed by: NURSE PRACTITIONER

## 2022-07-22 PROCEDURE — 96372 THER/PROPH/DIAG INJ SC/IM: CPT | Performed by: NURSE PRACTITIONER

## 2022-07-22 RX ORDER — KETOROLAC TROMETHAMINE 30 MG/ML
30 INJECTION, SOLUTION INTRAMUSCULAR; INTRAVENOUS ONCE
Status: COMPLETED | OUTPATIENT
Start: 2022-07-22 | End: 2022-07-22

## 2022-07-22 RX ORDER — TRAZODONE HYDROCHLORIDE 50 MG/1
50 TABLET, FILM COATED ORAL AT BEDTIME
Qty: 30 TABLET | Refills: 0 | Status: SHIPPED | OUTPATIENT
Start: 2022-07-22 | End: 2022-08-21

## 2022-07-22 RX ADMIN — KETOROLAC TROMETHAMINE 30 MG: 30 INJECTION, SOLUTION INTRAMUSCULAR; INTRAVENOUS at 14:47

## 2022-07-22 SDOH — HEALTH STABILITY - MENTAL HEALTH: OTHER PHYSICAL AND MENTAL STRAIN RELATED TO WORK: Z56.6

## 2022-07-22 NOTE — PATIENT INSTRUCTIONS
Take medications as prescribed, make appointment to follow up with primary care provider regarding these issues as soon as you can get in.    Do not take Ibuprofen or Naproxen or any other non-steroidal anti-inflammatory medication for the next 6 hours.

## 2022-07-22 NOTE — PROGRESS NOTES
Chief Complaint   Patient presents with     Headache     Pounding headache/migraine started this morning   Took tylenol and melatonin   Worked over night  Unable to sleep         ICD-10-CM    1. Stress at work  Z56.6    2. Tension headache  G44.209 ketorolac (TORADOL) injection 30 mg   3. Anxiety  F41.9    4. Other insomnia  G47.09 traZODone (DESYREL) 50 MG tablet   5. Exposure to COVID-19 virus  Z20.822 Asymptomatic COVID-19 Virus (Coronavirus) by PCR     Asymptomatic COVID-19 Virus (Coronavirus) by PCR Nose   Toradol injection began relieving headache before patient left.  She had no other drug reaction after 20 minutes.  We spent some time discussing stress relieving measures and her work situation.  Support was provided.  Headache is most likely related to her stress and tension.  She has previously been on antianxiety medications but does not remember that what they were and it was several years ago.  Suggested she make appoint with primary care provider to discuss a possible medicine for long-term plan.  She has tried melatonin and Benadryl for sleep without success.  We will try small dose of trazodone to see if this is effective for her.  She is informed this will not be refilled through the urgent care but will need to get refills through her primary care provider.    Patient had exposure to COVID-19 virus for several days, with the last day being 3 days ago.  So she has been tested for COVID-19.    45 minutes spent on the date of the encounter doing chart review, history and exam, documentation and further activities per the note    Subjective     [facility] 47 year [facility] female who presents to clinic today for increased stress,at work, insomnia that is not new, headache that started this morning, took Tylenol and Melatonin.  She has not been doing anything to manage her stress.    She also reports an exposure to COVID-19 by a coworker over the last week.  The last day the coworker worked  was 2 days ago.  Patient is asymptomatic but request COVID-19 testing.    ROS: 10 point ROS neg other than the symptoms noted above in the HPI.       Objective    BP (!) 150/88   Pulse 82   Temp 98.1  F (36.7  C) (Tympanic)   Resp 20   Wt 92.1 kg (203 lb)   LMP 10/27/2019 (Approximate)   SpO2 100%   BMI 33.78 kg/m    Nurses notes and VS have been reviewed.    Physical Exam       GENERAL APPEARANCE: alert and moderate distress     EYES: PERRL, EOMI, sclera non-icteric     HENT: oral exam benign, mucus membranes intact, without ulcers or lesions     NECK: no adenopathy or asymmetry, thyroid normal to palpation     RESP: lungs clear to auscultation - no rales, rhonchi or wheezes     CV: regular rates and rhythm, no murmurs, rubs, or gallop     MS: extremities normal- no gross deformities noted; normal muscle tone.     SKIN: no suspicious lesions or rashes     NEURO: Normal strength and tone, mentation intact and speech normal     PSYCH: normal thought process; no significant mood disturbance    Patient Instructions   Take medications as prescribed, make appointment to follow up with primary care provider regarding these issues as soon as you can get in.    Do not take Ibuprofen or Naproxen or any other non-steroidal anti-inflammatory medication for the next 6 hours.      KATARINA Leonard, CNP  [facility] Provider    The use of Dragon/Biodesix dictation services may have been used to construct the content in this note; any grammatical or spelling errors are non-intentional. Please contact the author of this note directly if you are in need of any clarification.

## 2022-07-22 NOTE — NURSING NOTE
Clinic Administered Medication Documentation    Administrations This Visit     ketorolac (TORADOL) injection 30 mg     Admin Date  07/22/2022 Action  Given Dose  30 mg Route  Intramuscular Site  Left Deltoid Administered By  Naomy Tirado CMA    Ordering Provider: Elizabeth Araujo CNP    Patient Supplied?: No                  Injectable Medication Documentation    Patient was given Ketorolac Tromethamine (Toradol). Prior to medication administration, verified patients identity using patient s name and date of birth. Please see MAR and medication order for additional information. Patient instructed to remain in clinic for 15 minutes.      Was entire vial of medication used? Yes  Vial/Syringe: Single dose vial  Expiration Date:  1/2024  Was this medication supplied by the patient? No

## 2022-08-10 ENCOUNTER — TELEPHONE (OUTPATIENT)
Dept: GASTROENTEROLOGY | Facility: CLINIC | Age: 47
End: 2022-08-10

## 2022-08-10 ENCOUNTER — HOSPITAL ENCOUNTER (OUTPATIENT)
Facility: AMBULATORY SURGERY CENTER | Age: 47
End: 2022-08-10
Attending: SURGERY | Admitting: SURGERY
Payer: COMMERCIAL

## 2022-08-10 ENCOUNTER — OFFICE VISIT (OUTPATIENT)
Dept: FAMILY MEDICINE | Facility: CLINIC | Age: 47
End: 2022-08-10
Payer: COMMERCIAL

## 2022-08-10 VITALS
HEIGHT: 66 IN | HEART RATE: 75 BPM | DIASTOLIC BLOOD PRESSURE: 78 MMHG | SYSTOLIC BLOOD PRESSURE: 114 MMHG | RESPIRATION RATE: 14 BRPM | OXYGEN SATURATION: 100 % | BODY MASS INDEX: 32.78 KG/M2 | WEIGHT: 204 LBS | TEMPERATURE: 96 F

## 2022-08-10 DIAGNOSIS — Z00.00 ENCOUNTER FOR PREVENTIVE CARE: Primary | ICD-10-CM

## 2022-08-10 DIAGNOSIS — R76.11 POSITIVE PPD: ICD-10-CM

## 2022-08-10 DIAGNOSIS — Z12.31 ENCOUNTER FOR SCREENING MAMMOGRAM FOR BREAST CANCER: ICD-10-CM

## 2022-08-10 DIAGNOSIS — Z13.220 SCREENING CHOLESTEROL LEVEL: ICD-10-CM

## 2022-08-10 DIAGNOSIS — Z11.1 SCREENING EXAMINATION FOR PULMONARY TUBERCULOSIS: ICD-10-CM

## 2022-08-10 DIAGNOSIS — Z23 HIGH PRIORITY FOR 2019-NCOV VACCINE: ICD-10-CM

## 2022-08-10 DIAGNOSIS — Z13.1 SCREENING FOR DIABETES MELLITUS: ICD-10-CM

## 2022-08-10 DIAGNOSIS — Z12.11 SCREEN FOR COLON CANCER: Primary | ICD-10-CM

## 2022-08-10 DIAGNOSIS — Z12.12 SCREENING FOR MALIGNANT NEOPLASM OF THE RECTUM: ICD-10-CM

## 2022-08-10 LAB
ALBUMIN SERPL-MCNC: 3.8 G/DL (ref 3.4–5)
ALP SERPL-CCNC: 103 U/L (ref 40–150)
ALT SERPL W P-5'-P-CCNC: 38 U/L (ref 0–50)
ANION GAP SERPL CALCULATED.3IONS-SCNC: 5 MMOL/L (ref 3–14)
AST SERPL W P-5'-P-CCNC: 17 U/L (ref 0–45)
BILIRUB SERPL-MCNC: 0.2 MG/DL (ref 0.2–1.3)
BUN SERPL-MCNC: 16 MG/DL (ref 7–30)
CALCIUM SERPL-MCNC: 9.5 MG/DL (ref 8.5–10.1)
CHLORIDE BLD-SCNC: 103 MMOL/L (ref 94–109)
CHOLEST SERPL-MCNC: 285 MG/DL
CO2 SERPL-SCNC: 32 MMOL/L (ref 20–32)
CREAT SERPL-MCNC: 0.51 MG/DL (ref 0.52–1.04)
FASTING STATUS PATIENT QL REPORTED: YES
GFR SERPL CREATININE-BSD FRML MDRD: >90 ML/MIN/1.73M2
GLUCOSE BLD-MCNC: 94 MG/DL (ref 70–99)
HDLC SERPL-MCNC: 90 MG/DL
LDLC SERPL CALC-MCNC: 183 MG/DL
NONHDLC SERPL-MCNC: 195 MG/DL
POTASSIUM BLD-SCNC: 4.2 MMOL/L (ref 3.4–5.3)
PROT SERPL-MCNC: 8 G/DL (ref 6.8–8.8)
SODIUM SERPL-SCNC: 140 MMOL/L (ref 133–144)
TRIGL SERPL-MCNC: 59 MG/DL

## 2022-08-10 PROCEDURE — 80061 LIPID PANEL: CPT | Performed by: PHYSICIAN ASSISTANT

## 2022-08-10 PROCEDURE — 0064A COVID-19,PF,MODERNA (18+ YRS BOOSTER .25ML): CPT | Performed by: PHYSICIAN ASSISTANT

## 2022-08-10 PROCEDURE — 80053 COMPREHEN METABOLIC PANEL: CPT | Performed by: PHYSICIAN ASSISTANT

## 2022-08-10 PROCEDURE — 36415 COLL VENOUS BLD VENIPUNCTURE: CPT | Performed by: PHYSICIAN ASSISTANT

## 2022-08-10 PROCEDURE — 86481 TB AG RESPONSE T-CELL SUSP: CPT | Performed by: PHYSICIAN ASSISTANT

## 2022-08-10 PROCEDURE — 99396 PREV VISIT EST AGE 40-64: CPT | Mod: 25 | Performed by: PHYSICIAN ASSISTANT

## 2022-08-10 PROCEDURE — 91306 COVID-19,PF,MODERNA (18+ YRS BOOSTER .25ML): CPT | Performed by: PHYSICIAN ASSISTANT

## 2022-08-10 ASSESSMENT — ANXIETY QUESTIONNAIRES
IF YOU CHECKED OFF ANY PROBLEMS ON THIS QUESTIONNAIRE, HOW DIFFICULT HAVE THESE PROBLEMS MADE IT FOR YOU TO DO YOUR WORK, TAKE CARE OF THINGS AT HOME, OR GET ALONG WITH OTHER PEOPLE: SOMEWHAT DIFFICULT
8. IF YOU CHECKED OFF ANY PROBLEMS, HOW DIFFICULT HAVE THESE MADE IT FOR YOU TO DO YOUR WORK, TAKE CARE OF THINGS AT HOME, OR GET ALONG WITH OTHER PEOPLE?: SOMEWHAT DIFFICULT
6. BECOMING EASILY ANNOYED OR IRRITABLE: NEARLY EVERY DAY
IF YOU CHECKED OFF ANY PROBLEMS ON THIS QUESTIONNAIRE, HOW DIFFICULT HAVE THESE PROBLEMS MADE IT FOR YOU TO DO YOUR WORK, TAKE CARE OF THINGS AT HOME, OR GET ALONG WITH OTHER PEOPLE: SOMEWHAT DIFFICULT
2. NOT BEING ABLE TO STOP OR CONTROL WORRYING: SEVERAL DAYS
4. TROUBLE RELAXING: NEARLY EVERY DAY
GAD7 TOTAL SCORE: 16
GAD7 TOTAL SCORE: 7
4. TROUBLE RELAXING: NOT AT ALL
1. FEELING NERVOUS, ANXIOUS, OR ON EDGE: SEVERAL DAYS
5. BEING SO RESTLESS THAT IT IS HARD TO SIT STILL: NOT AT ALL
3. WORRYING TOO MUCH ABOUT DIFFERENT THINGS: NEARLY EVERY DAY
1. FEELING NERVOUS, ANXIOUS, OR ON EDGE: NEARLY EVERY DAY
GAD7 TOTAL SCORE: 7
7. FEELING AFRAID AS IF SOMETHING AWFUL MIGHT HAPPEN: SEVERAL DAYS
7. FEELING AFRAID AS IF SOMETHING AWFUL MIGHT HAPPEN: SEVERAL DAYS
GAD7 TOTAL SCORE: 7
3. WORRYING TOO MUCH ABOUT DIFFERENT THINGS: NEARLY EVERY DAY
5. BEING SO RESTLESS THAT IT IS HARD TO SIT STILL: NOT AT ALL
7. FEELING AFRAID AS IF SOMETHING AWFUL MIGHT HAPPEN: SEVERAL DAYS
2. NOT BEING ABLE TO STOP OR CONTROL WORRYING: NEARLY EVERY DAY
6. BECOMING EASILY ANNOYED OR IRRITABLE: SEVERAL DAYS

## 2022-08-10 ASSESSMENT — PATIENT HEALTH QUESTIONNAIRE - PHQ9
10. IF YOU CHECKED OFF ANY PROBLEMS, HOW DIFFICULT HAVE THESE PROBLEMS MADE IT FOR YOU TO DO YOUR WORK, TAKE CARE OF THINGS AT HOME, OR GET ALONG WITH OTHER PEOPLE: SOMEWHAT DIFFICULT
SUM OF ALL RESPONSES TO PHQ QUESTIONS 1-9: 12
SUM OF ALL RESPONSES TO PHQ QUESTIONS 1-9: 9
SUM OF ALL RESPONSES TO PHQ QUESTIONS 1-9: 9

## 2022-08-10 ASSESSMENT — PAIN SCALES - GENERAL: PAINLEVEL: MODERATE PAIN (4)

## 2022-08-10 NOTE — TELEPHONE ENCOUNTER
Screening Questions    BlueKIND OF PREP RedLOCATION [review exclusion criteria] GreenSEDATION TYPE      1. Are you active on mychart? Yes    2. What insurance is in the chart? [employer]     3.   Ordering/Referring Provider: [place]    4. BMI   (If greater than 40 review exclusion criteria [PAC APPT IF [MAC] @ [facility])  33.43  [If yes, BMI OVER 40-EXTENDED PREP]      **(Sedation review/consideration needed)**  Do you have a legal guardian or Medical Power of    and/or are you able to give consent for your medical care?     Yes    5. Have you had a positive covid test in the last 90 days?   N - NA    6.  Are you currently on dialysis?   N [ If yes, G-PREP & HOSPITAL setting ONLY]     7.  Do you have chronic kidney disease?  N [ If yes, G-PREP ]    8.   Do you have a diagnosis of diabetes?   N   [ If yes, G-PREP ]    9.  On a regular basis do you go 3-5 days between bowel movements?   N   [ If yes, EXTENDED PREP]    10.  Are you taking any prescription pain medications on a routine schedule?    N - NA [ If yes, EXTENDED PREP] [If yes, MAC]      11.   Do you have any chemical dependencies such as alcohol, street drugs, or methadone?    N [If yes, MAC]    12.   Do you have any history of post-traumatic stress syndrome, severe anxiety or history of psychosis?    N  [If yes, MAC]    13.  [FEMALES] Are you currently pregnant? NA    If yes, how many weeks?       Respiratory/Heart Screening:  [If yes to any of the following HOSPITAL setting only]     14. Do you have Pulmonary Hypertension [Lungs]?   N       15. Do you have UNCONTROLLED asthma?   N     16.  Do you use daily home oxygen?  N      17. Do you have mod to severe Obstructive Sleep Apnea?         (OKAY @ [facility]  [facility]  SH  PH  RI  MG - if pt is not on OXYGEN)  N      18.   Have you had a heart or lung transplant?   N      19.   Have you had a stroke or Transient ischemic attack (TIA - aka  mini stroke ) within 6 months?  (If yes, please review exclusion criteria)  N      20.   In the past 6 months, have you had any heart related issues including cardiomyopathy or heart attack?   N           If yes, did it require cardiac stenting or other implantable device?   N      21.   Do you have any implantable devices in your body (pacemaker, defib, LVAD)? (If yes, please review exclusion criteria)  N   22.  Do you take the medication Phentermine?  NO        23. Do you take nitroglycerin?   N           If yes, how often? NA  (if yes, HOSPITAL setting ONLY)    24.  Are you currently taking any blood thinners?    [If yes, INFORM patient to [facility] w/ ORDERING PROVIDER FOR BRIDGING INSTRUCTIONS]     N    25.   Do you transfer independently?                (If NO, please HOSPITAL setting ONLY)  Yes    26.   Preferred LOCAL Pharmacy for Pre Prescription:      [id number] - [place], [place] - [address]    Scheduling Details  (Please ask for phone number if not scheduled by patient)      Caller : Karol  Date of Procedure: 9/27/22  Surgeon: Zach  Location: [facility]        Sedation Type: CS l   Conscious Sedation- Needs  for 6 hours after the procedure  MAC/General-Needs  for 24 hours after procedure    NA :[Pre-op Required] at [facility]  [facility]  MG and OR for MAC sedation   (advise patient they will need a pre-op WITH IN 30 DAYS of procedure date)     Type of Procedure Scheduled:   Lower Endoscopy [Colonoscopy]    Which Colonoscopy Prep was Sent?:   Golytely due to magnesium citrate recall -     KHOR[facility]S CF PATIENTS & GROEN'S PATIENTS NEEDS EXTENDED PREP       Informed patient they will need an adult  Yes  Cannot take any type of public or medical transportation alone    Pre-Procedure Covid test to be completed at [facility] or Externally: HOME  **INFORMED OF HOME TESTING & LAB OPTION**        Confirmed Nurse will call to complete assessment Yes    Additional comments:
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
## 2022-08-12 ENCOUNTER — TELEPHONE (OUTPATIENT)
Dept: FAMILY MEDICINE | Facility: CLINIC | Age: 47
End: 2022-08-12

## 2022-08-12 DIAGNOSIS — Z01.84 IMMUNITY STATUS TESTING: Primary | ICD-10-CM

## 2022-08-12 LAB
GAMMA INTERFERON BACKGROUND BLD IA-ACNC: 0.06 IU/ML
M TB IFN-G BLD-IMP: NEGATIVE
M TB IFN-G CD4+ BCKGRND COR BLD-ACNC: 9.94 IU/ML
MITOGEN IGNF BCKGRD COR BLD-ACNC: 0 IU/ML
MITOGEN IGNF BCKGRD COR BLD-ACNC: 0.08 IU/ML
QUANTIFERON MITOGEN: 10 IU/ML
QUANTIFERON NIL TUBE: 0.06 IU/ML
QUANTIFERON TB1 TUBE: 0.14 IU/ML
QUANTIFERON TB2 TUBE: 0.06

## 2022-08-12 NOTE — TELEPHONE ENCOUNTER
Reason for Call:  Form, our goal is to have forms completed with 72 hours, however, some forms may require a visit or additional information.    Type of letter, form or note:  medical    Who is the form from?: Patient    Where did the form come from: Patient or family brought in       What clinic location was the form placed at?: [place]    Where the form was placed: Given to MA/RN    What number is listed as a contact on the form?: [phone]       Additional comments: Forms    Call taken on 8/12/2022 at 3:50 PM by Tash Maxwell CNA

## 2022-08-15 ENCOUNTER — TELEPHONE (OUTPATIENT)
Dept: FAMILY MEDICINE | Facility: CLINIC | Age: 47
End: 2022-08-15

## 2022-08-15 NOTE — TELEPHONE ENCOUNTER
Patient requesting call to go over TB results from 8/10 visit.     Phone: [phone]    Lary Yang, Patient Representative - [facility]

## 2022-09-19 RX ORDER — BISACODYL 5 MG
TABLET, DELAYED RELEASE (ENTERIC COATED) ORAL
Qty: 4 TABLET | Refills: 0 | Status: SHIPPED | OUTPATIENT
Start: 2022-09-19 | End: 2022-09-27 | Stop reason: HOSPADM

## 2022-09-26 RX ORDER — LIDOCAINE 40 MG/G
CREAM TOPICAL
Status: CANCELLED | OUTPATIENT
Start: 2022-09-26

## 2022-09-26 RX ORDER — ONDANSETRON 2 MG/ML
4 INJECTION INTRAMUSCULAR; INTRAVENOUS
Status: CANCELLED | OUTPATIENT
Start: 2022-09-26

## 2022-11-07 ENCOUNTER — TELEPHONE (OUTPATIENT)
Dept: FAMILY MEDICINE | Facility: CLINIC | Age: 47
End: 2022-11-07

## 2022-11-07 NOTE — TELEPHONE ENCOUNTER
Reason for Call:  Form, our goal is to have forms completed with 72 hours, however, some forms may require a visit or additional information.    Type of letter, form or note:  medical    Who is the form from?: Patient    Where did the form come from: Patient or family brought in       What clinic location was the form placed at?: [place]    Where the form was placed: [employer] Box/Folder    What number is listed as a contact on the form?: [phone]       Additional comments:     Call taken on 11/7/2022 at 4:48 PM by Esther Humphrey

## 2022-11-09 ENCOUNTER — TELEPHONE (OUTPATIENT)
Dept: FAMILY MEDICINE | Facility: CLINIC | Age: 47
End: 2022-11-09

## 2022-11-09 NOTE — TELEPHONE ENCOUNTER
Spoke with patient and left paperwork at the  for .    Lary Yagn, Patient Representative - [facility]

## 2022-11-09 NOTE — TELEPHONE ENCOUNTER
Reason for Call:  Form, our goal is to have forms completed with 72 hours, however, some forms may require a visit or additional information.    Type of letter, form or note:  immunization record form    Who is the form from?: Patient    Where did the form come from: Patient or family brought in       What clinic location was the form placed at?: [place]    Where the form was placed: [employer]PE[employer] Box/Folder    What number is listed as a contact on the form?: [phone]        Additional comments: call patient to  form when completed-- first and second dose will go into the 'third dose' spot-- the third dose was placed in first dose spot by mistake     Call taken on 11/9/2022 at 9:14 AM by Sarah Pang

## 2022-11-10 NOTE — TELEPHONE ENCOUNTER
Spoke with patent and she is sending her son Jatin Scruggs to  form.    Lary Yang, Patient Representative - [facility]

## 2022-11-20 ENCOUNTER — HEALTH MAINTENANCE LETTER (OUTPATIENT)
Age: 47
End: 2022-11-20

## 2023-04-07 ENCOUNTER — DOCUMENTATION ONLY (OUTPATIENT)
Dept: LAB | Facility: CLINIC | Age: 48
End: 2023-04-07

## 2023-04-07 ENCOUNTER — LAB (OUTPATIENT)
Dept: LAB | Facility: CLINIC | Age: 48
End: 2023-04-07

## 2023-04-07 DIAGNOSIS — Z11.1 SCREENING EXAMINATION FOR PULMONARY TUBERCULOSIS: Primary | ICD-10-CM

## 2023-04-07 DIAGNOSIS — Z11.1 SCREENING EXAMINATION FOR PULMONARY TUBERCULOSIS: ICD-10-CM

## 2023-04-07 PROCEDURE — 36415 COLL VENOUS BLD VENIPUNCTURE: CPT

## 2023-04-07 PROCEDURE — 86481 TB AG RESPONSE T-CELL SUSP: CPT

## 2023-04-07 NOTE — PROGRESS NOTES
Karol is requesting a TB test for a lab appt scheduled 4/7/23. Please place orders or contact pt with further info.

## 2023-04-10 LAB
GAMMA INTERFERON BACKGROUND BLD IA-ACNC: 0.05 IU/ML
M TB IFN-G BLD-IMP: NEGATIVE
M TB IFN-G CD4+ BCKGRND COR BLD-ACNC: 9.95 IU/ML
MITOGEN IGNF BCKGRD COR BLD-ACNC: 0.07 IU/ML
MITOGEN IGNF BCKGRD COR BLD-ACNC: 0.13 IU/ML
QUANTIFERON MITOGEN: 10 IU/ML
QUANTIFERON NIL TUBE: 0.05 IU/ML
QUANTIFERON TB1 TUBE: 0.18 IU/ML
QUANTIFERON TB2 TUBE: 0.12

## 2023-04-11 NOTE — RESULT ENCOUNTER NOTE
Ms. Lexis,    All of your labs were normal/near normal for you.    Please contact the clinic if you have additional questions.  Thank you.    Sincerely,    Michael Cruz PA-C

## 2023-08-02 ENCOUNTER — TELEPHONE (OUTPATIENT)
Dept: FAMILY MEDICINE | Facility: CLINIC | Age: 48
End: 2023-08-02

## 2023-08-02 NOTE — TELEPHONE ENCOUNTER
Reason for Call:  Form, our goal is to have forms completed with 72 hours, however, some forms may require a visit or additional information.    Type of letter, form or note:  school     Who is the form from?: Patient    Where did the form come from: Patient or family brought in       What clinic location was the form placed at?: [place]    Where the form was placed: Given to MA/RN    What number is listed as a contact on the form?: [phone]       Additional comments: please call Pt when form is completed     Call taken on 8/2/2023 at 2:08 PM by Ariadna Burgess

## 2023-08-07 NOTE — TELEPHONE ENCOUNTER
letter was picked up from  by self. ID was checked and patient  label was attached to patient  log.

## 2023-09-16 ENCOUNTER — HEALTH MAINTENANCE LETTER (OUTPATIENT)
Age: 48
End: 2023-09-16

## 2023-11-25 ENCOUNTER — HEALTH MAINTENANCE LETTER (OUTPATIENT)
Age: 48
End: 2023-11-25

## 2024-11-09 ENCOUNTER — HEALTH MAINTENANCE LETTER (OUTPATIENT)
Age: 49
End: 2024-11-09

## 2024-11-14 NOTE — NURSING NOTE
I have reviewed information from the [facility]IT[facility]ING PRO[facility]:  Reports are compliant with drug, quantity, prescribe, dispenser, and recipient history.     Last dispensed on 10/16/2024 #60   Prior to immunization administration, verified patients identity using patient s name and date of birth. Please see Immunization Activity for additional information.     Screening Questionnaire for Adult Immunization    Are you sick today?   No   Do you have allergies to medications, food, a vaccine component or latex?   No   Have you ever had a serious reaction after receiving a vaccination?   No   Do you have a long-term health problem with heart, lung, kidney, or metabolic disease (e.g., diabetes), asthma, a blood disorder, no spleen, complement component deficiency, a cochlear implant, or a spinal fluid leak?  Are you on long-term aspirin therapy?   No   Do you have cancer, leukemia, HIV/AIDS, or any other immune system problem?   No   Do you have a parent, brother, or sister with an immune system problem?   No   In the past 3 months, have you taken medications that affect  your immune system, such as prednisone, other steroids, or anticancer drugs; drugs for the treatment of rheumatoid arthritis, Crohn s disease, or psoriasis; or have you had radiation treatments?   No   Have you had a seizure, or a brain or other nervous system problem?   No   During the past year, have you received a transfusion of blood or blood    products, or been given immune (gamma) globulin or antiviral drug?   No   For women: Are you pregnant or is there a chance you could become       pregnant during the next month?   No   Have you received any vaccinations in the past 4 weeks?   No     Immunization questionnaire answers were all negative.      All 3 doses of hep b are needed per Marsha  Per orders of Marsha Mahmood, injection of Hep B given by Carolyne Daniel CMA. Patient instructed to remain in clinic for 15 minutes afterwards, and to report any adverse reaction to me immediately.       Screening performed by Carolyne Daniel CMA on 8/13/2021 at 9:58 AM.